# Patient Record
Sex: MALE | Race: WHITE | NOT HISPANIC OR LATINO | Employment: OTHER | URBAN - NONMETROPOLITAN AREA
[De-identification: names, ages, dates, MRNs, and addresses within clinical notes are randomized per-mention and may not be internally consistent; named-entity substitution may affect disease eponyms.]

---

## 2023-06-06 ENCOUNTER — OFFICE VISIT (OUTPATIENT)
Dept: PRIMARY CARE | Facility: CLINIC | Age: 83
End: 2023-06-06
Payer: MEDICARE

## 2023-06-06 VITALS
DIASTOLIC BLOOD PRESSURE: 78 MMHG | HEART RATE: 103 BPM | HEIGHT: 72 IN | SYSTOLIC BLOOD PRESSURE: 139 MMHG | BODY MASS INDEX: 21.67 KG/M2 | WEIGHT: 160 LBS

## 2023-06-06 DIAGNOSIS — F17.200 SMOKER: ICD-10-CM

## 2023-06-06 DIAGNOSIS — Z00.00 MEDICARE ANNUAL WELLNESS VISIT, SUBSEQUENT: ICD-10-CM

## 2023-06-06 DIAGNOSIS — I10 HYPERTENSION, BENIGN: ICD-10-CM

## 2023-06-06 DIAGNOSIS — I10 PRIMARY HYPERTENSION: ICD-10-CM

## 2023-06-06 DIAGNOSIS — K21.9 GASTROESOPHAGEAL REFLUX DISEASE WITHOUT ESOPHAGITIS: ICD-10-CM

## 2023-06-06 DIAGNOSIS — F41.9 ANXIETY: Primary | ICD-10-CM

## 2023-06-06 DIAGNOSIS — G89.29 CHRONIC BILATERAL LOW BACK PAIN WITHOUT SCIATICA: ICD-10-CM

## 2023-06-06 DIAGNOSIS — M54.50 CHRONIC BILATERAL LOW BACK PAIN WITHOUT SCIATICA: ICD-10-CM

## 2023-06-06 DIAGNOSIS — K44.9 HIATAL HERNIA: ICD-10-CM

## 2023-06-06 PROBLEM — M16.11 OSTEOARTHRITIS OF RIGHT HIP: Status: ACTIVE | Noted: 2023-06-06

## 2023-06-06 PROBLEM — M54.9 BACK PAIN: Status: ACTIVE | Noted: 2023-06-06

## 2023-06-06 PROCEDURE — 99213 OFFICE O/P EST LOW 20 MIN: CPT | Performed by: INTERNAL MEDICINE

## 2023-06-06 PROCEDURE — 1160F RVW MEDS BY RX/DR IN RCRD: CPT | Performed by: INTERNAL MEDICINE

## 2023-06-06 PROCEDURE — 1159F MED LIST DOCD IN RCRD: CPT | Performed by: INTERNAL MEDICINE

## 2023-06-06 PROCEDURE — 3075F SYST BP GE 130 - 139MM HG: CPT | Performed by: INTERNAL MEDICINE

## 2023-06-06 PROCEDURE — G0439 PPPS, SUBSEQ VISIT: HCPCS | Performed by: INTERNAL MEDICINE

## 2023-06-06 PROCEDURE — 3078F DIAST BP <80 MM HG: CPT | Performed by: INTERNAL MEDICINE

## 2023-06-06 RX ORDER — ALPRAZOLAM 0.5 MG/1
0.5 TABLET ORAL DAILY PRN
Qty: 30 TABLET | Refills: 2 | Status: SHIPPED | OUTPATIENT
Start: 2023-06-06 | End: 2023-09-06 | Stop reason: SDUPTHER

## 2023-06-06 RX ORDER — HYDROGEN PEROXIDE 3 %
1 SOLUTION, NON-ORAL MISCELLANEOUS DAILY
COMMUNITY
End: 2023-06-06 | Stop reason: SDUPTHER

## 2023-06-06 RX ORDER — ALPRAZOLAM 0.5 MG/1
TABLET ORAL
COMMUNITY
Start: 2019-11-11 | End: 2023-06-06 | Stop reason: SDUPTHER

## 2023-06-06 RX ORDER — HYDROGEN PEROXIDE 3 %
20 SOLUTION, NON-ORAL MISCELLANEOUS DAILY
Qty: 90 CAPSULE | Refills: 3 | Status: SHIPPED | OUTPATIENT
Start: 2023-06-06 | End: 2024-05-08 | Stop reason: SDUPTHER

## 2023-06-06 RX ORDER — AMLODIPINE AND BENAZEPRIL HYDROCHLORIDE 10; 20 MG/1; MG/1
1 CAPSULE ORAL DAILY
Qty: 90 CAPSULE | Refills: 3 | Status: SHIPPED | OUTPATIENT
Start: 2023-06-06

## 2023-06-06 RX ORDER — OXYBUTYNIN CHLORIDE 5 MG/1
TABLET, EXTENDED RELEASE ORAL
COMMUNITY
Start: 2022-12-07 | End: 2024-01-08 | Stop reason: WASHOUT

## 2023-06-06 RX ORDER — AMLODIPINE AND BENAZEPRIL HYDROCHLORIDE 10; 20 MG/1; MG/1
1 CAPSULE ORAL DAILY
COMMUNITY
Start: 2020-05-06 | End: 2023-06-06 | Stop reason: SDUPTHER

## 2023-06-06 RX ORDER — MELOXICAM 15 MG/1
1 TABLET ORAL DAILY PRN
COMMUNITY
Start: 2021-05-12

## 2023-06-06 ASSESSMENT — PATIENT HEALTH QUESTIONNAIRE - PHQ9
2. FEELING DOWN, DEPRESSED OR HOPELESS: NEARLY EVERY DAY
SUM OF ALL RESPONSES TO PHQ9 QUESTIONS 1 AND 2: 6
1. LITTLE INTEREST OR PLEASURE IN DOING THINGS: NEARLY EVERY DAY

## 2023-06-06 NOTE — PROGRESS NOTES
Chief Complaint: Medicare Wellness Exam/Comprehensive Problem Focused Follow Up and Physical Exam    HPI:  Patient is here today for [facility].     Active Problem List  Patient Active Problem List   Diagnosis    Anxiety    Hiatal hernia    Hypertension, benign    Back pain    Osteoarthritis of right hip       Comprehensive Medical/Surgical/Social/Family History  Past Medical History:   Diagnosis Date    Anxiety disorder, unspecified 12/06/2022    Anxiety    Essential (primary) hypertension 12/06/2022    HTN (hypertension)    Personal history of other diseases of the digestive system 06/08/2020    History of right inguinal hernia    Personal history of other specified conditions     History of dyspnea    Personal history of other specified conditions     History of dizziness    Personal history of pneumonia (recurrent)     History of pneumonia    Unilateral primary osteoarthritis, right hip 01/15/2020    Osteoarthritis of right hip     Past Surgical History:   Procedure Laterality Date    OTHER SURGICAL HISTORY  01/09/2020    Colonoscopy    OTHER SURGICAL HISTORY  01/09/2020    Vasectomy    OTHER SURGICAL HISTORY  02/17/2022    Inguinal hernia repair laparoscopic     Social History     Tobacco Use    Smoking status: Every Day     Types: Cigarettes    Smokeless tobacco: Never   Substance Use Topics    Alcohol use: Yes    Drug use: Never     No family history on file.      Allergies and Medications  Levofloxacin  Current Outpatient Medications on File Prior to Visit   Medication Sig Dispense Refill    meloxicam (Mobic) 15 mg tablet Take 1 tablet (15 mg) by mouth once daily as needed.      oxybutynin XL (Ditropan-XL) 5 mg 24 hr tablet       [DISCONTINUED] ALPRAZolam (Xanax) 0.5 mg tablet Take by mouth.      [DISCONTINUED] amLODIPine-benazepriL (Lotrel) 10-20 mg capsule Take 1 capsule by mouth once daily.      [DISCONTINUED] esomeprazole (NexIUM) 20 mg DR capsule Take 1 capsule (20 mg) by mouth once daily.       No current  facility-administered medications on file prior to visit.       Medicare Wellness Questionnaire        How have you been on Medicare less than a year ? No  Have you had a Medicare Wellness exam before ? Yes  Have you had any surgeries in the last year ? No  Have you developed any new diseases in the last year ? No  Have any close family members developed new diseases in the last year ? No  Have you been to a the hospital in the last year ? No  Do you take any pills or supplements other than those prescribed for you ? Yes  Do you take any opiates for pain such as Tramadol, Percocet or Norco ? No  How do you consider your overall health ?Fair  Have you ever used tobacco products ? Yes   Have you smoked more than 100 cigarettes in your life ?  Yes  What form of tobacco have you used ? Cigarettes  How many packs per day ? 1.5 ppd  How many years ? 55+ years   Have you quit ? No  Would you like to quit ? No  Do you drink alcohol ?  yes   What is the most you drink in one day ? 4  How many drinks usually in 1 Week ? 24  Do you or others tell you that your drinking is a problem ? No  Would you like to help to cut down or quit ? No  Have you ever used illegal drugs at anytime in your life including Marijuana ? Yes   Do you currently use any illegal drugs ? No  Which of the following describes your diet ?Unhealthy  How many days per week on average do you exercise ? 0 days  Do you have any loss of hearing ? No  Do you have hearing aids ? No  Have you or others noted you have loss of memory ? No  Do you need someone to assist you with any of the following ?none  Do you need someone to assist you with any of the following ?none  Have you fallen in the last 6 months ? No  Do you have any of the following in your house ? none  Do you have a living will ? No  Do you have a durable power of  for health care decisions ? No    Medications and Supplements  prescribed by me and other practitioners or clinical pharmacist (such  "as prescriptions, OTC's, herbal therapies and supplements) were reviewed and documented in the medical record.    Tobacco/Alcohol/Opioid use, as well as Illicit Drug Use was screened for/reviewed and documented in Social History section and medication list as appropriate  Activities of Daily Living  In your present state of health, do you have any difficulty performing the following activities?:   Preparing food and eating?: No  Bathing yourself: No  Getting dressed: No  Using the toilet:No  Moving around from place to place: No  In the past year have you fallen or had a near fall?:No    Depression Screen  (Note: if answer to either of the following is \"Yes\", then a more complete depression screening is indicated)   Q1: Over the past two weeks, have you felt down, depressed or hopeless?  No   Q2: Over the past two weeks, have you felt little interest or pleasure in doing things?  no    Current exercise habits: The patient does not participate in regular exercise at present.   Dietary issues discussed: Yes  Hearing difficulties: No  Safe in current home environment: yes  Visual Acuity assessed: no  Cognitive Impairment assessed: yes       Advance directives  Advanced Care Planning (including a Living Will, Healthcare POA, as well as specific end of life choices and/or directives), was discussed for approximately 1 minutes with the patient and/or surrogate, voluntarily, and documented in the medical record.     Cardiac Risk Assessment  Cardiovascular risk was discussed and, if needed, lifestyle modifications recommended, including nutritional choices, exercise, and elimination of habits contributing to risk. We agreed on a plan to reduce the current cardiovascular risk based on above discussion as needed.  Aspirin use/disuse was discussed after reviewing the updated guidelines below:    Consider low dose Aspirin ( mg) use if the benefit for cardiovascular disease prevention outweighs risk for bleeding " complications.   In general, low dose ASA should be considered:  In patients WITHOUT prior MI/stroke/PAD (primary prevention):   a. Age <60: Use if 10-year cardiovascular disease risk >20%, with discussion of risks and benefits with patient  b. Age 60-<70: Use if 10-year cardiovascular disease risk >20% and low bleeding (e.g., gastrointenstinal) risk, with discussion of risks and benefits with patient  c. Age >=70: Do not use    In patients WITH prior MI/stroke/PAD (secondary prevention):   Generally use unless extremely high bleeding (e.g., gastrointenstinal) risk, with discussion of risks and benefits with patient    ROS otherwise negative aside from what was mentioned above in HPI.    Vitals  /78 (BP Location: Right arm, Patient Position: Sitting, BP Cuff Size: Adult)   Pulse 103   Ht 1.829 m (6')   Wt 72.6 kg (160 lb)   BMI 21.70 kg/m²   Body mass index is 21.7 kg/m².  Physical Exam  Gen: Alert, NAD  HEENT:  PERRLA, EOMI, conjunctiva and sclera normal in appearance.   Neck:  Supple with FROM; No masses/nodes palpable; Thyroid nontender and without nodules; No PIERRE  Respiratory:  Lungs CTAB  Cardiovascular:  Heart RRR. No M/R/G. Peripheral pulses equal bilaterally  Abdomen:  Soft, nontender, BS present throughout; No R/G/R; No HSM or masses palpated  Extremities:  FROM all extremities; Muscle strength grossly normal with good tone  Neuro:  CN II-XII intact; Reflexes 2+/2+; Gross motor and sensory intact  Skin:  No suspicious lesions present      Assessment and Plan:  Problem List Items Addressed This Visit          Respiratory    Hiatal hernia       Circulatory    Hypertension, benign       Other    Anxiety - Primary    Relevant Medications    ALPRAZolam (Xanax) 0.5 mg tablet    Back pain     Other Visit Diagnoses       Gastroesophageal reflux disease without esophagitis        Relevant Medications    esomeprazole (NexIUM) 20 mg DR capsule    Primary hypertension        Relevant Medications     amLODIPine-benazepriL (Lotrel) 10-20 mg capsule    Other Relevant Orders    Comprehensive Metabolic Panel    Lipid Panel          1. Anxiety   - uses benzo prn  - I have personally reviewed this patient's OARRS report and found it to be appropriate. The report has been uploaded into the medical record. I have considered the risks of abuse, addiction, dependence, and diversion and feel that it is clinically appropriate for this patient to be prescribed this controlled medication.     2. HTN , controlled  - continue norvasc-benazapril 10-20 1 tab po daily   - will order cmp       3. GERD   - continue Nexium 20mg po daily     4. Back pain  - saw ortho and was told was coming from his back  - declines to do PT or see Pain Management   - sees chiropractor periodically      5 Urinary urge incontinence   - psa was normal in 2021  - discussed Referral to urology, declines for now , improved with bladder meds, takes a few times a week     6. Counseled on smoking cessation and cutting back on alcohol       During the course of the visit the patient was educated and counseled about age appropriate screening and preventive services. Completed preventive screenings were documented in the chart and orders were placed for outstanding screenings/procedures as documented in the Assessment and Plan.      Patient Instructions (the written plan) was given to the patient at check out.      Tata Billingsley, DO

## 2023-09-06 ENCOUNTER — OFFICE VISIT (OUTPATIENT)
Dept: PRIMARY CARE | Facility: CLINIC | Age: 83
End: 2023-09-06
Payer: MEDICARE

## 2023-09-06 VITALS
SYSTOLIC BLOOD PRESSURE: 122 MMHG | HEART RATE: 108 BPM | WEIGHT: 157 LBS | DIASTOLIC BLOOD PRESSURE: 80 MMHG | BODY MASS INDEX: 21.26 KG/M2 | HEIGHT: 72 IN

## 2023-09-06 DIAGNOSIS — M54.50 CHRONIC BILATERAL LOW BACK PAIN WITHOUT SCIATICA: ICD-10-CM

## 2023-09-06 DIAGNOSIS — I10 HYPERTENSION, BENIGN: Primary | ICD-10-CM

## 2023-09-06 DIAGNOSIS — G89.29 CHRONIC BILATERAL LOW BACK PAIN WITHOUT SCIATICA: ICD-10-CM

## 2023-09-06 DIAGNOSIS — F41.9 ANXIETY: ICD-10-CM

## 2023-09-06 PROCEDURE — 99213 OFFICE O/P EST LOW 20 MIN: CPT | Performed by: INTERNAL MEDICINE

## 2023-09-06 PROCEDURE — 3079F DIAST BP 80-89 MM HG: CPT | Performed by: INTERNAL MEDICINE

## 2023-09-06 PROCEDURE — 3074F SYST BP LT 130 MM HG: CPT | Performed by: INTERNAL MEDICINE

## 2023-09-06 PROCEDURE — 1160F RVW MEDS BY RX/DR IN RCRD: CPT | Performed by: INTERNAL MEDICINE

## 2023-09-06 PROCEDURE — 1159F MED LIST DOCD IN RCRD: CPT | Performed by: INTERNAL MEDICINE

## 2023-09-06 RX ORDER — ALPRAZOLAM 0.5 MG/1
0.5 TABLET ORAL DAILY PRN
Qty: 30 TABLET | Refills: 2 | Status: SHIPPED | OUTPATIENT
Start: 2023-09-06 | End: 2023-11-08 | Stop reason: SDUPTHER

## 2023-09-06 ASSESSMENT — ENCOUNTER SYMPTOMS: BACK PAIN: 1

## 2023-09-06 NOTE — PROGRESS NOTES
Subjective   Patient ID: Kashif Capps is a 83 y.o. male who presents for Follow-up (3 month/Follow up on labs).  HPI  Patient is here today for 3 mo follow up  Patient reports that he is having increasing back pain.  Pt saw Dr Nayak who told him that it was coming from his back.   Declines Pt, WILL REFER to pain management.     Review of Systems   Musculoskeletal:  Positive for back pain.       Objective   /80 (BP Location: Right arm, Patient Position: Sitting, BP Cuff Size: Adult)   Pulse 108   Ht 1.829 m (6')   Wt 71.2 kg (157 lb)   BMI 21.29 kg/m²     Physical Exam  Constitutional:       Appearance: Normal appearance.   Neurological:      Mental Status: He is alert.   Psychiatric:         Mood and Affect: Mood normal.         Behavior: Behavior normal.         Thought Content: Thought content normal.           Assessment/Plan   Problem List Items Addressed This Visit       Anxiety    Hypertension, benign - Primary    Back pain   1. Anxiety   - uses benzo prn  - I have personally reviewed this patient's OARRS report and found it to be appropriate. The report has been uploaded into the medical record. I have considered the risks of abuse, addiction, dependence, and diversion and feel that it is clinically appropriate for this patient to be prescribed this controlled medication.     2. HTN , controlled  - continue norvasc-benazapril 10-20 1 tab po daily       3. GERD   - continue Nexium 20mg po daily     4. Back pain  - saw ortho and was told was coming from his back  - will refer to pain management   - sees chiropractor periodically          Final diagnoses:   [F41.9] Anxiety   [I10] Hypertension, benign   [M54.50, G89.29] Chronic bilateral low back pain without sciatica

## 2023-09-08 ENCOUNTER — TELEPHONE (OUTPATIENT)
Dept: PRIMARY CARE | Facility: CLINIC | Age: 83
End: 2023-09-08
Payer: MEDICARE

## 2023-09-08 DIAGNOSIS — M54.50 CHRONIC BILATERAL LOW BACK PAIN WITHOUT SCIATICA: Primary | ICD-10-CM

## 2023-09-08 DIAGNOSIS — G89.29 CHRONIC BILATERAL LOW BACK PAIN WITHOUT SCIATICA: Primary | ICD-10-CM

## 2023-10-26 ENCOUNTER — APPOINTMENT (OUTPATIENT)
Dept: PAIN MEDICINE | Facility: CLINIC | Age: 83
End: 2023-10-26
Payer: MEDICARE

## 2023-11-08 ENCOUNTER — OFFICE VISIT (OUTPATIENT)
Dept: PRIMARY CARE | Facility: CLINIC | Age: 83
End: 2023-11-08
Payer: MEDICARE

## 2023-11-08 VITALS
BODY MASS INDEX: 21.4 KG/M2 | HEART RATE: 125 BPM | DIASTOLIC BLOOD PRESSURE: 80 MMHG | WEIGHT: 158 LBS | SYSTOLIC BLOOD PRESSURE: 129 MMHG | HEIGHT: 72 IN

## 2023-11-08 DIAGNOSIS — M54.50 CHRONIC BILATERAL LOW BACK PAIN WITHOUT SCIATICA: ICD-10-CM

## 2023-11-08 DIAGNOSIS — G89.29 CHRONIC BILATERAL LOW BACK PAIN WITHOUT SCIATICA: ICD-10-CM

## 2023-11-08 DIAGNOSIS — F41.9 ANXIETY: ICD-10-CM

## 2023-11-08 DIAGNOSIS — I10 HYPERTENSION, BENIGN: ICD-10-CM

## 2023-11-08 DIAGNOSIS — Z79.899 CONTROLLED SUBSTANCE AGREEMENT SIGNED: Primary | ICD-10-CM

## 2023-11-08 PROCEDURE — 80307 DRUG TEST PRSMV CHEM ANLYZR: CPT

## 2023-11-08 PROCEDURE — 99213 OFFICE O/P EST LOW 20 MIN: CPT | Performed by: INTERNAL MEDICINE

## 2023-11-08 PROCEDURE — 80358 DRUG SCREENING METHADONE: CPT

## 2023-11-08 PROCEDURE — 82570 ASSAY OF URINE CREATININE: CPT

## 2023-11-08 PROCEDURE — 3074F SYST BP LT 130 MM HG: CPT | Performed by: INTERNAL MEDICINE

## 2023-11-08 PROCEDURE — 3079F DIAST BP 80-89 MM HG: CPT | Performed by: INTERNAL MEDICINE

## 2023-11-08 PROCEDURE — 1159F MED LIST DOCD IN RCRD: CPT | Performed by: INTERNAL MEDICINE

## 2023-11-08 PROCEDURE — 80373 DRUG SCREENING TRAMADOL: CPT

## 2023-11-08 PROCEDURE — 80368 SEDATIVE HYPNOTICS: CPT

## 2023-11-08 PROCEDURE — 80346 BENZODIAZEPINES1-12: CPT

## 2023-11-08 PROCEDURE — 1160F RVW MEDS BY RX/DR IN RCRD: CPT | Performed by: INTERNAL MEDICINE

## 2023-11-08 PROCEDURE — 80365 DRUG SCREENING OXYCODONE: CPT

## 2023-11-08 PROCEDURE — 80361 OPIATES 1 OR MORE: CPT

## 2023-11-08 PROCEDURE — 80354 DRUG SCREENING FENTANYL: CPT

## 2023-11-08 RX ORDER — ALPRAZOLAM 0.5 MG/1
0.5 TABLET ORAL DAILY PRN
Qty: 90 TABLET | Refills: 0 | Status: SHIPPED | OUTPATIENT
Start: 2023-11-08 | End: 2024-02-08 | Stop reason: SDUPTHER

## 2023-11-08 ASSESSMENT — ENCOUNTER SYMPTOMS
SHORTNESS OF BREATH: 0
NAUSEA: 0
VOMITING: 0
APPETITE CHANGE: 0
COUGH: 0
ACTIVITY CHANGE: 0
FATIGUE: 0
CHILLS: 0
BACK PAIN: 1
WHEEZING: 0
NUMBNESS: 0
ABDOMINAL DISTENTION: 0
WEAKNESS: 0
DIARRHEA: 0
SINUS PAIN: 0
SINUS PRESSURE: 0
SORE THROAT: 0

## 2023-11-08 NOTE — PROGRESS NOTES
Subjective   Patient ID: Kashif Capps is a 83 y.o. male who presents for Follow-up (2 month) and Back Pain.  Back Pain  Pertinent negatives include no chest pain, numbness or weakness.     Patient is here today for 2 mo follow up   His significant other has moved back to [place] permanently to live with her daughters, she was getting to be too much for him with her dementia,     He had seen pain management and since Dr Schulte left he was going to  hold off but with Dr Prieto helping, he is going to call them to schedule.       Review of Systems   Constitutional:  Negative for activity change, appetite change, chills and fatigue.   HENT:  Negative for congestion, postnasal drip, sinus pressure, sinus pain and sore throat.    Respiratory:  Negative for cough, shortness of breath and wheezing.    Cardiovascular:  Negative for chest pain and leg swelling.   Gastrointestinal:  Negative for abdominal distention, diarrhea, nausea and vomiting.   Musculoskeletal:  Positive for back pain.   Neurological:  Negative for weakness and numbness.       Objective   /80   Pulse (!) 125   Ht 1.829 m (6')   Wt 71.7 kg (158 lb)   BMI 21.43 kg/m²     Physical Exam  Constitutional:       General: He is not in acute distress.     Appearance: Normal appearance.   HENT:      Head: Normocephalic.      Nose: Nose normal.      Mouth/Throat:      Pharynx: No oropharyngeal exudate.   Eyes:      General:         Right eye: No discharge.         Left eye: No discharge.      Extraocular Movements: Extraocular movements intact.      Pupils: Pupils are equal, round, and reactive to light.   Cardiovascular:      Rate and Rhythm: Normal rate and regular rhythm.      Heart sounds: No murmur heard.     No gallop.   Pulmonary:      Effort: Pulmonary effort is normal. No respiratory distress.      Breath sounds: Normal breath sounds. No wheezing.   Musculoskeletal:         General: No swelling. Normal range of motion.   Skin:     General: Skin is  warm and dry.      Coloration: Skin is not jaundiced.   Neurological:      General: No focal deficit present.      Mental Status: He is alert and oriented to person, place, and time.      Cranial Nerves: No cranial nerve deficit.   Psychiatric:         Mood and Affect: Mood normal.         Behavior: Behavior normal.       OARRS:  No data recorded  I have personally reviewed the OARRS report for Kashif Capps. I have considered the risks of abuse, dependence, addiction and diversion and I believe that it is clinically appropriate for Kashif Capps to be prescribed this medication    Is the patient prescribed a combination of a benzodiazepine and opioid?  No    Last Urine Drug Screen / ordered today: Yes  No results found for this or any previous visit (from the past 8760 hour(s)).  N/A        Controlled Substance Agreement:  Date of the Last Agreement: 11/08/2023  Reviewed Controlled Substance Agreement including but not limited to the benefits, risks, and alternatives to treatment with a Controlled Substance medication(s).    Benzodiazepines:  What is the patient's goal of therapy? Improvement of anxiety   Is this being achieved with current treatment? yes    JEANNETTE-7:  No data recorded    Activities of Daily Living:   Is your overall impression that this patient is benefiting (symptom reduction outweighs side effects) from benzodiazepine therapy? Yes     1. Physical Functioning: Better  2. Family Relationship: Better  3. Social Relationship: Better  4. Mood: Better  5. Sleep Patterns: Better  6. Overall Function: Better    Assessment/Plan   Problem List Items Addressed This Visit       Anxiety    Relevant Medications    ALPRAZolam (Xanax) 0.5 mg tablet    Hypertension, benign    Back pain     Other Visit Diagnoses       Controlled substance agreement signed    -  Primary    Relevant Orders    Opiate/Opioid/Benzo Prescription Compliance          1. Anxiety   - uses benzo prn  - I have personally reviewed this patient's  OARRS report and found it to be appropriate. The report has been uploaded into the medical record. I have considered the risks of abuse, addiction, dependence, and diversion and feel that it is clinically appropriate for this patient to be prescribed this controlled medication.     2. HTN , controlled  - continue norvasc-benazapril 10-20 1 tab po daily       3. GERD   - continue Nexium 20mg po daily     4. Back pain  - saw ortho and was told was coming from his back  - is going to call to schedule with pain managment  Final diagnoses:   [F41.9] Anxiety   [Z79.899] Controlled substance agreement signed   [I10] Hypertension, benign   [M54.50, G89.29] Chronic bilateral low back pain without sciatica

## 2023-11-09 LAB
AMPHETAMINES UR QL SCN: NORMAL
BARBITURATES UR QL SCN: NORMAL
BZE UR QL SCN: NORMAL
CANNABINOIDS UR QL SCN: NORMAL
CREAT UR-MCNC: 148.4 MG/DL (ref 20–370)
PCP UR QL SCN: NORMAL

## 2023-11-13 LAB
1OH-MIDAZOLAM UR CFM-MCNC: <25 NG/ML
6MAM UR CFM-MCNC: <25 NG/ML
7AMINOCLONAZEPAM UR CFM-MCNC: <25 NG/ML
A-OH ALPRAZ UR CFM-MCNC: 79 NG/ML
ALPRAZ UR CFM-MCNC: 44 NG/ML
CHLORDIAZEP UR CFM-MCNC: <25 NG/ML
CLONAZEPAM UR CFM-MCNC: <25 NG/ML
CODEINE UR CFM-MCNC: <50 NG/ML
DIAZEPAM UR CFM-MCNC: <25 NG/ML
EDDP UR CFM-MCNC: <25 NG/ML
FENTANYL UR CFM-MCNC: <2.5 NG/ML
HYDROCODONE CTO UR CFM-MCNC: <25 NG/ML
HYDROMORPHONE UR CFM-MCNC: <25 NG/ML
LORAZEPAM UR CFM-MCNC: <25 NG/ML
METHADONE UR CFM-MCNC: <25 NG/ML
MIDAZOLAM UR CFM-MCNC: <25 NG/ML
MORPHINE UR CFM-MCNC: <50 NG/ML
NORDIAZEPAM UR CFM-MCNC: <25 NG/ML
NORFENTANYL UR CFM-MCNC: <2.5 NG/ML
NORHYDROCODONE UR CFM-MCNC: <25 NG/ML
NOROXYCODONE UR CFM-MCNC: <25 NG/ML
NORTRAMADOL UR-MCNC: <50 NG/ML
OXAZEPAM UR CFM-MCNC: <25 NG/ML
OXYCODONE UR CFM-MCNC: <25 NG/ML
OXYMORPHONE UR CFM-MCNC: <25 NG/ML
TEMAZEPAM UR CFM-MCNC: <25 NG/ML
TRAMADOL UR CFM-MCNC: <50 NG/ML
ZOLPIDEM UR CFM-MCNC: <25 NG/ML
ZOLPIDEM UR-MCNC: <25 NG/ML

## 2024-01-04 ENCOUNTER — PREP FOR PROCEDURE (OUTPATIENT)
Dept: OPERATING ROOM | Facility: HOSPITAL | Age: 84
End: 2024-01-04
Payer: MEDICARE

## 2024-01-04 DIAGNOSIS — H25.812 COMBINED FORMS OF AGE-RELATED CATARACT OF LEFT EYE: Primary | ICD-10-CM

## 2024-01-04 RX ORDER — KETOROLAC TROMETHAMINE 5 MG/ML
1 SOLUTION OPHTHALMIC
Status: CANCELLED | OUTPATIENT
Start: 2024-01-11 | End: 2024-01-11

## 2024-01-04 RX ORDER — PREDNISOLONE ACETATE 10 MG/ML
1 SUSPENSION/ DROPS OPHTHALMIC ONCE
Status: CANCELLED | OUTPATIENT
Start: 2024-01-11 | End: 2024-01-04

## 2024-01-04 RX ORDER — TETRACAINE HYDROCHLORIDE 5 MG/ML
1 SOLUTION OPHTHALMIC ONCE
Status: CANCELLED | OUTPATIENT
Start: 2024-01-11 | End: 2024-01-04

## 2024-01-04 RX ORDER — POVIDONE-IODINE 5 %
SOLUTION, NON-ORAL OPHTHALMIC (EYE) ONCE
Status: CANCELLED | OUTPATIENT
Start: 2024-01-11 | End: 2024-01-04

## 2024-01-08 NOTE — PREPROCEDURE INSTRUCTIONS
No outpatient medications have been marked as taking for the 1/11/24 encounter (Hospital Encounter).                       NPO Instructions:    Nothing to eat or drink after midnight    Additional Instructions:   Will need  home, will receive call day before surgery with arrival time

## 2024-01-10 ENCOUNTER — ANESTHESIA EVENT (OUTPATIENT)
Dept: OPERATING ROOM | Facility: HOSPITAL | Age: 84
End: 2024-01-10
Payer: MEDICARE

## 2024-01-11 ENCOUNTER — ANESTHESIA (OUTPATIENT)
Dept: OPERATING ROOM | Facility: HOSPITAL | Age: 84
End: 2024-01-11
Payer: MEDICARE

## 2024-01-11 ENCOUNTER — HOSPITAL ENCOUNTER (OUTPATIENT)
Facility: HOSPITAL | Age: 84
Setting detail: OUTPATIENT SURGERY
Discharge: HOME | End: 2024-01-11
Attending: OPHTHALMOLOGY | Admitting: OPHTHALMOLOGY
Payer: MEDICARE

## 2024-01-11 VITALS
BODY MASS INDEX: 21.53 KG/M2 | OXYGEN SATURATION: 98 % | DIASTOLIC BLOOD PRESSURE: 73 MMHG | WEIGHT: 158.95 LBS | RESPIRATION RATE: 18 BRPM | HEART RATE: 74 BPM | TEMPERATURE: 97.8 F | SYSTOLIC BLOOD PRESSURE: 124 MMHG | HEIGHT: 72 IN

## 2024-01-11 PROBLEM — H25.812 COMBINED FORMS OF AGE-RELATED CATARACT OF LEFT EYE: Status: RESOLVED | Noted: 2024-01-04 | Resolved: 2024-01-11

## 2024-01-11 PROCEDURE — C1780 LENS, INTRAOCULAR (NEW TECH): HCPCS | Performed by: OPHTHALMOLOGY

## 2024-01-11 PROCEDURE — 2500000001 HC RX 250 WO HCPCS SELF ADMINISTERED DRUGS (ALT 637 FOR MEDICARE OP): Performed by: OPHTHALMOLOGY

## 2024-01-11 PROCEDURE — 3700000002 HC GENERAL ANESTHESIA TIME - EACH INCREMENTAL 1 MINUTE: Performed by: OPHTHALMOLOGY

## 2024-01-11 PROCEDURE — 2500000004 HC RX 250 GENERAL PHARMACY W/ HCPCS (ALT 636 FOR OP/ED): Performed by: ANESTHESIOLOGY

## 2024-01-11 PROCEDURE — 3600000008 HC OR TIME - EACH INCREMENTAL 1 MINUTE - PROCEDURE LEVEL THREE: Performed by: OPHTHALMOLOGY

## 2024-01-11 PROCEDURE — 3700000001 HC GENERAL ANESTHESIA TIME - INITIAL BASE CHARGE: Performed by: OPHTHALMOLOGY

## 2024-01-11 PROCEDURE — 2760000001 HC OR 276 NO HCPCS: Performed by: OPHTHALMOLOGY

## 2024-01-11 PROCEDURE — 7100000009 HC PHASE TWO TIME - INITIAL BASE CHARGE: Performed by: OPHTHALMOLOGY

## 2024-01-11 PROCEDURE — 2720000007 HC OR 272 NO HCPCS: Performed by: OPHTHALMOLOGY

## 2024-01-11 PROCEDURE — 2500000004 HC RX 250 GENERAL PHARMACY W/ HCPCS (ALT 636 FOR OP/ED): Mod: JZ | Performed by: OPHTHALMOLOGY

## 2024-01-11 PROCEDURE — 3600000003 HC OR TIME - INITIAL BASE CHARGE - PROCEDURE LEVEL THREE: Performed by: OPHTHALMOLOGY

## 2024-01-11 PROCEDURE — 7100000010 HC PHASE TWO TIME - EACH INCREMENTAL 1 MINUTE: Performed by: OPHTHALMOLOGY

## 2024-01-11 DEVICE — IMPLANTABLE DEVICE: Type: IMPLANTABLE DEVICE | Site: EYE | Status: FUNCTIONAL

## 2024-01-11 RX ORDER — KETOROLAC TROMETHAMINE 5 MG/ML
1 SOLUTION OPHTHALMIC
Status: COMPLETED | OUTPATIENT
Start: 2024-01-11 | End: 2024-01-11

## 2024-01-11 RX ORDER — TETRACAINE HYDROCHLORIDE 5 MG/ML
1 SOLUTION OPHTHALMIC ONCE
Status: COMPLETED | OUTPATIENT
Start: 2024-01-11 | End: 2024-01-11

## 2024-01-11 RX ORDER — MIDAZOLAM HYDROCHLORIDE 1 MG/ML
1 INJECTION INTRAMUSCULAR; INTRAVENOUS ONCE
Status: COMPLETED | OUTPATIENT
Start: 2024-01-11 | End: 2024-01-11

## 2024-01-11 RX ORDER — MIDAZOLAM HYDROCHLORIDE 1 MG/ML
INJECTION INTRAMUSCULAR; INTRAVENOUS AS NEEDED
Status: DISCONTINUED | OUTPATIENT
Start: 2024-01-11 | End: 2024-01-11

## 2024-01-11 RX ORDER — FENTANYL CITRATE 50 UG/ML
INJECTION, SOLUTION INTRAMUSCULAR; INTRAVENOUS AS NEEDED
Status: DISCONTINUED | OUTPATIENT
Start: 2024-01-11 | End: 2024-01-11

## 2024-01-11 RX ORDER — PREDNISOLONE ACETATE 10 MG/ML
1 SUSPENSION/ DROPS OPHTHALMIC ONCE
Status: COMPLETED | OUTPATIENT
Start: 2024-01-11 | End: 2024-01-11

## 2024-01-11 RX ORDER — ACETAZOLAMIDE 250 MG/1
500 TABLET ORAL ONCE
Status: COMPLETED | OUTPATIENT
Start: 2024-01-11 | End: 2024-01-11

## 2024-01-11 RX ORDER — SODIUM CHLORIDE, SODIUM LACTATE, POTASSIUM CHLORIDE, CALCIUM CHLORIDE 600; 310; 30; 20 MG/100ML; MG/100ML; MG/100ML; MG/100ML
50 INJECTION, SOLUTION INTRAVENOUS CONTINUOUS
Status: DISCONTINUED | OUTPATIENT
Start: 2024-01-11 | End: 2024-01-11 | Stop reason: HOSPADM

## 2024-01-11 RX ORDER — POVIDONE-IODINE 5 %
SOLUTION, NON-ORAL OPHTHALMIC (EYE) ONCE
Status: COMPLETED | OUTPATIENT
Start: 2024-01-11 | End: 2024-01-11

## 2024-01-11 RX ORDER — ONDANSETRON HYDROCHLORIDE 2 MG/ML
4 INJECTION, SOLUTION INTRAVENOUS ONCE
Status: COMPLETED | OUTPATIENT
Start: 2024-01-11 | End: 2024-01-11

## 2024-01-11 RX ADMIN — FENTANYL CITRATE 25 MCG: 50 INJECTION, SOLUTION INTRAMUSCULAR; INTRAVENOUS at 10:29

## 2024-01-11 RX ADMIN — MIDAZOLAM HYDROCHLORIDE 1 MG: 1 INJECTION, SOLUTION INTRAMUSCULAR; INTRAVENOUS at 10:11

## 2024-01-11 RX ADMIN — PHENYLEPHRINE HYDROCHLORIDE 1 DROP: 100 SOLUTION/ DROPS OPHTHALMIC at 09:20

## 2024-01-11 RX ADMIN — MIDAZOLAM HYDROCHLORIDE 0.5 MG: 1 INJECTION, SOLUTION INTRAMUSCULAR; INTRAVENOUS at 10:25

## 2024-01-11 RX ADMIN — TETRACAINE HYDROCHLORIDE 1 DROP: 5 SOLUTION OPHTHALMIC at 09:07

## 2024-01-11 RX ADMIN — FENTANYL CITRATE 50 MCG: 50 INJECTION, SOLUTION INTRAMUSCULAR; INTRAVENOUS at 10:22

## 2024-01-11 RX ADMIN — PHENYLEPHRINE HYDROCHLORIDE 1 DROP: 100 SOLUTION/ DROPS OPHTHALMIC at 09:08

## 2024-01-11 RX ADMIN — POLYVINYL ALCOHOL, POVIDONE 1 DROP: 14; 6 SOLUTION/ DROPS OPHTHALMIC at 09:07

## 2024-01-11 RX ADMIN — PHENYLEPHRINE HYDROCHLORIDE 1 DROP: 100 SOLUTION/ DROPS OPHTHALMIC at 09:13

## 2024-01-11 RX ADMIN — MIDAZOLAM HYDROCHLORIDE 0.5 MG: 1 INJECTION, SOLUTION INTRAMUSCULAR; INTRAVENOUS at 10:21

## 2024-01-11 RX ADMIN — SODIUM CHLORIDE, POTASSIUM CHLORIDE, SODIUM LACTATE AND CALCIUM CHLORIDE 50 ML/HR: 600; 310; 30; 20 INJECTION, SOLUTION INTRAVENOUS at 09:14

## 2024-01-11 RX ADMIN — KETOROLAC TROMETHAMINE 1 DROP: 5 SOLUTION OPHTHALMIC at 09:30

## 2024-01-11 RX ADMIN — ACETAZOLAMIDE 500 MG: 250 TABLET ORAL at 11:02

## 2024-01-11 RX ADMIN — POLYVINYL ALCOHOL, POVIDONE 1 DROP: 14; 6 SOLUTION/ DROPS OPHTHALMIC at 09:14

## 2024-01-11 RX ADMIN — MIDAZOLAM HYDROCHLORIDE 0.5 MG: 1 INJECTION, SOLUTION INTRAMUSCULAR; INTRAVENOUS at 10:35

## 2024-01-11 RX ADMIN — KETOROLAC TROMETHAMINE 1 DROP: 5 SOLUTION OPHTHALMIC at 09:08

## 2024-01-11 RX ADMIN — KETOROLAC TROMETHAMINE 1 DROP: 5 SOLUTION OPHTHALMIC at 09:20

## 2024-01-11 RX ADMIN — POVIDONE-IODINE: 5 SOLUTION OPHTHALMIC at 09:07

## 2024-01-11 RX ADMIN — KETOROLAC TROMETHAMINE 1 DROP: 5 SOLUTION OPHTHALMIC at 09:14

## 2024-01-11 RX ADMIN — ONDANSETRON 4 MG: 2 INJECTION INTRAMUSCULAR; INTRAVENOUS at 09:14

## 2024-01-11 RX ADMIN — PHENYLEPHRINE HYDROCHLORIDE 1 DROP: 100 SOLUTION/ DROPS OPHTHALMIC at 09:30

## 2024-01-11 RX ADMIN — POLYVINYL ALCOHOL, POVIDONE 1 DROP: 14; 6 SOLUTION/ DROPS OPHTHALMIC at 09:20

## 2024-01-11 RX ADMIN — POLYVINYL ALCOHOL, POVIDONE 1 DROP: 14; 6 SOLUTION/ DROPS OPHTHALMIC at 09:30

## 2024-01-11 SDOH — HEALTH STABILITY: MENTAL HEALTH: CURRENT SMOKER: 0

## 2024-01-11 ASSESSMENT — PAIN - FUNCTIONAL ASSESSMENT
PAIN_FUNCTIONAL_ASSESSMENT: 0-10
PAIN_FUNCTIONAL_ASSESSMENT: 0-10

## 2024-01-11 ASSESSMENT — COLUMBIA-SUICIDE SEVERITY RATING SCALE - C-SSRS
2. HAVE YOU ACTUALLY HAD ANY THOUGHTS OF KILLING YOURSELF?: NO
1. IN THE PAST MONTH, HAVE YOU WISHED YOU WERE DEAD OR WISHED YOU COULD GO TO SLEEP AND NOT WAKE UP?: NO
6. HAVE YOU EVER DONE ANYTHING, STARTED TO DO ANYTHING, OR PREPARED TO DO ANYTHING TO END YOUR LIFE?: NO

## 2024-01-11 ASSESSMENT — PAIN SCALES - GENERAL
PAINLEVEL_OUTOF10: 0 - NO PAIN
PAINLEVEL_OUTOF10: 1
PAIN_LEVEL: 0

## 2024-01-11 NOTE — OP NOTE
Phacoemulsification Cataract with Insertion Intraocular Lens (L) Operative Note     Date: 2024  OR Location: [facility] OR    Name: Kashif Capps, : 1940, Age: 83 y.o., MRN: [id number], Sex: male    Diagnosis  Pre-op Diagnosis     * Combined forms of age-related cataract of left eye [H25.812] Post-op Diagnosis     * Combined forms of age-related cataract of left eye [H25.812]     Procedures  Phacoemulsification Cataract with Insertion Intraocular Lens  45734 - [place] XCAPSL CTRC RMVL INSJ IO LENS PROSTH W/O ECP      Surgeons      * Dmitri Kimball - Primary    Resident/Fellow/Other Assistant:  Surgeon(s) and Role:    Procedure Summary  Anesthesia: Monitor Anesthesia Care  ASA: II  Anesthesia Staff: Anesthesiologist: Celio Tran DO  Estimated Blood Loss: None  Intra-op Medications:   Medication Name Total Dose   lidocaine 1%-phenylephrine 1.5% intravitreal injection 2 mL   moxifloxacin (Vigamox) 1.5 mg/1 mL (0.15%) injection 1.5 mg 1.5 mg   prednisoLONE acetate (Pred-Forte) 1 % ophthalmic suspension 1 drop 1 drop   lactated Ringer's infusion Cannot be calculated     Anesthesia Record        Intraprocedure I/O Totals       Intake    lactated Ringer's infusion 350.00 mL    Total Intake 350 mL     Specimen: No specimens collected     Staff:   Circulator: Arabella Watkins RN  Scrub Person: Akira Zavala RN    Drains and/or Catheters: * None in log *    Tourniquet Times:       Implants:  Implants       Type Name Action Serial No.      Lens LENS, INTRAOCULAR, [id number] 17.5 - [id number] - [id number] Implanted [id number]        Findings: Combined Form Age Related Cataract Left Eye    Indications: Kashif Capps is an 83 y.o. male who is having surgery for Combined forms of age-related cataract of left eye [H25.812]. Decreased vision left eye for near and distance.  Difficulty seeing for reading and watching TV left eye.  Difficulty seeing to drive left eye, complains of glare and halos.     The patient was seen in the  preoperative area. The risks, benefits, complications, treatment options, non-operative alternatives, expected recovery and outcomes were discussed with the patient. The possibilities of reaction to medication, pulmonary aspiration, injury to surrounding structures, bleeding, recurrent infection, the need for additional procedures, failure to diagnose a condition, and creating a complication requiring transfusion or operation were discussed with the patient. The patient concurred with the proposed plan, giving informed consent.  The site of surgery was properly noted/marked if necessary per policy. The patient has been actively warmed in preoperative area. Preoperative antibiotics are not indicated. Venous thrombosis prophylaxis are not indicated.    Procedure Details: The patient was correctly identified in the preop area and the operative eye was marked with a marking pen. The operative eye was dilated in the preoperative area.  The patient was then taken to the operating room where timeout was performed before starting the procedure. Combined anesthesia  with intravenous sedation and topical tetracaine eyedrops were given the left eye. The operative eye was prepped and draped in the standard sterile ophthalmic fashion in  preparation for ophthalmic surgery.  A Jose wire speculum was then inserted between the eyelids of the left eye and the operating microscope was placed over the left eye.  A paracentesis incision was made approximately 30° away from the planned surgical incision site with the help of MVR blade.  1% lidocaine MPF with Phenylephrine 1.5% PF was injected into the anterior chamber through the paracentesis incision. A near limbal clear corneal incision was fashioned in the temporal quadrant just outside the vascular arcade and Viscoat was injected into anterior chamber to firm the eye. A bent needle cystotome was used and Utrata forceps were utilized to create a continuous curvilinear  capsulorrhexis.  BSS was injected beneath the anterior capsule to hydrodissect the nucleus from adjacent cortex and capsule.  The residual cortex were then aspirated with irrigation aspiration handpiece. The posterior capsule was then polished with the help of soft irrigation-aspiration tip.  Provisc viscoelastic was then injected into the eye to reform the anterior chamber and to open the capsular bag.  The intraocular lens implant was taken from its sterile wrapping, inspected under the surgical microscope and found to be in good condition. The intraocular lens implant +17.5D was injected into the capsule bag. The Provisc was then aspirated from the anterior chamber and from behind the intraocular lens implant.  The anterior chamber was inflated with the help of BSS to moderate tension.  The edges of the surgical incision were then hydrated with the help of BSS.  Vigamox was then injected into the anterior chamber and into the capsule bag through the paracentesis incision. The surgical wound was then inspected and found to be watertight.  The wire speculum and drapes were then removed.  Pred Forte eyedrops, Acular eyedrops and Betadine 5% sterile ophthalmic solution were instilled in the conjunctival sac.     The patient tolerated the procedure well and was taken to recovery room in stable condition.    Complications:  None; patient tolerated the procedure well.    Disposition:  Home  Condition: stable     Attending Attestation: I performed the procedure.    Dmitri Kimball  Phone Number: [phone]

## 2024-01-11 NOTE — H&P
H&P Notes  - documented in this encounter  Dmitri Kimball MD - 01/08/2024 1:39 PM EST  Formatting of this note is different from the original.  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  HPI: Associated symptoms  Blurry vision, difficulty reading small print, glare and halos around lights at night    PAST MEDICAL HISTORY  Diagnosis Date  Anxiety  Erectile dysfunction  GERD (gastroesophageal reflux disease)  Hypertension    PAST SURGICAL HISTORY  Procedure Laterality Date  NONE    FAMILY HISTORY  Problem Relation Age of Onset  Macular Degen Mother    SOCIAL HISTORY:  Social History    Tobacco Use  Smoking status: Every Day  Packs/day: 2.00  Years: 65.00  Additional pack years: 0.00  Total pack years: 130.00  Types: Cigarettes  Smokeless tobacco: Never    MEDICATIONS:  Prior to Admission medications as of 1/8/24 1337  Medication Sig Last Dose Taking  fluorometholone (FML LIQUID FILM) 0.1 % ophthalmic suspension Use 1 Drop in both eyes three times a day. Taking Yes  amLODIPine-benazepril (LOTREL) 10-20 mg per capsule Take 1 capsule by mouth once daily. Taking Yes  ALPRAZolam (XANAX) 0.5 mg tablet Take 1 tablet by mouth at bedtime as needed. Taking Yes  esomeprazole (NEXIUM) 40 mg capsule Take 1 capsule by mouth once daily. Taking Yes  sildenafil (VIAGRA) 100 mg tablet Take 1 tablet by mouth as needed. Taking Yes    No medication comments found.    CURRENT ALLERGIES: ALLERGIES  No Known Allergies    REVIEW OF SYSTEMS:  PAIN ASSESSMENT:  General: No weight loss, malaise or fevers.  Neuro: No Hx  of stroke or seizures  Respiratory: No history of current cough or dyspnea, or pneumonia in the past 6 weeks. No history of respiratory/pulmonary symptoms or problems  Cardiovascular: Positive for: Hypertension  GI: No history of GI symptoms or problems. No history of esophageal varices, recent ascites, or ETOH greater than 2 drinks per day.  : No history of UTI in past 6 weeks. No history of renal failure. Not currently on or requiring dialysis. No history of  symptoms or problems.  GYN: N/A  Pregnancy: N/A  Endocrine: No history of diabetes. Has not taken steroids within the past 30 days. No history of endocrinological symptoms or problems.  Hematology: No history of bleeding or clotting disorder. Pt is not taking anti-coagulation or platelet medications. No history of hematological symptoms or problems.  Oncology: No history of CA metastasis, chemo within 30 days, or radiotherapy within 90 days. Has not lost 10% of body wt in 6 months. No history of oncological symptoms or problems.  Psych: Anxiety  Musculoskeletal: Negative for joint pain or swelling, back pain or muscle pain.  Skin: Negative for lesions, rash and itching.    PHYSICAL EXAM:  VITALS:  /72  Pulse 56        General: Alert and oriented  Skin: Normal color, no rash, no lesions.  HEENT: EOM, pupils equal, round and reactive.  Cardiovascular: Normal S1 & S2, no rubs, murmurs or gallops. No JVD. Pulse regular.  Lungs: Normal breath sounds, no wheezes or crackles.  Abdomen: Soft, non-tender, no rigidity.  Extremities: No deformity, no edema or tenderness, no joint swelling or clubbing.  Neurological: Normal cognition and motor skills.  Pulses: Carotid and radial pulses normal +2.    Diagnostic tests reviewed for today's visit:  No new labs    ASSESSMENT  Medication and Non-Pharmacologic VTE Prophylaxis/Anticoagulants      VTE Prophylaxis: VTE prophylaxis appropriate    Impression: There is no known pertinent medical condition which may affect  joel-operative course    Clinical Risk Factors for Possible Cardiac Complications:  None    Patient is scheduled for a low-risk procedure.    FUNCTIONAL STATUS: Walk indoors, such as around the house (1.75 METs)  Do light work around the house, such as dusting or washing dishes (2.70 METs)  Take care of self, that is eating, dressing, bathing, using the toilet (2.75 METs)    Functional Class (NYHA): N/A    [facility]: Not obtained    PLAN  CONSULTS:  Patient does not require consults for optimization at this time.    The Following Tests/Procedures Have Been Initiated:  None    Instructions Given to Patient:  Patient given verbal and written preop instructions and voices comprehension and compliance.    SIGNATURE: Dmitri Kimball MD PATIENT NAME: Kashif Capps  DATE: January 8, 2024 MRN: [id number]  TIME: 1:39 PM PAGER/CONTACT #:    Electronically signed by Dmitri Kimball MD at 01/08/2024 1:53 PM EST   Source Comments  - [facility]  In the event this information is protected by the Federal Confidentiality of Alcohol and Drug Abuse Patient Records regulations: This information has been disclosed to you from records protected by Federal confidentiality rules (42 CFR Part 2). The Federal rules prohibit you from making any further disclosure of this information unless further disclosure is expressly permitted by the written consent of the person to whom it pertains or as otherwise permitted by 42 CFR Part 2. A general authorization for the release of medical or other information is NOT sufficient for this purpose. The Federal rules restrict any use of the information to criminally investigate or prosecute any alcohol or drug abuse patient.  Reason for Visit    Reason for Visit -  Reason Comments   Blurred Vision Both Eyes     Difficulty Reading Both Eyes     Glare Both eyes     Encounter Details    Encounter Details  Date Type Department Care Team (Latest Contact Info) Description   01/08/2024 1:00 PM EST Office  Visit [facility]   [address] [place] [address]   [phone]  Dmitri Kimball MD   [address] [place] [address]   [phone] (Work)   [phone] (Fax)  Combined form of age-related cataract, left eye (Primary Dx);  Combined form of age-related cataract, right eye;  Early dry stage nonexudative age-related macular degeneration of both eyes;  Visual field loss, left eye ;  Essential hypertension;  Anxiety disorder, unspecified type     Social History  - documented as of this encounter  Social History  Tobacco Use Types Packs/Day Years Used Date   Smoking Tobacco: Every Day Cigarettes 2 65     Smokeless Tobacco: Never             Social History  Alcohol Use Standard Drinks/Week Comments   Not Asked 0 (1 standard drink = 0.6 oz pure alcohol) occasional     Social History  Sex and Gender Information Value Date Recorded   Sex Assigned at Birth Not on file     Gender Identity Not on file     Sexual Orientation Not on file       Last Filed Vital Signs  - documented in this encounter  Last Filed Vital Signs  Vital Sign Reading Time Taken Comments   Blood Pressure 153/72 01/08/2024 1:14 PM EST     Pulse 56 01/08/2024 1:14 PM EST     Temperature - -     Respiratory Rate - -     Oxygen Saturation - -     Inhaled Oxygen Concentration - -     Weight - -     Height - -     Body Mass Index - -       Functional Status  - documented as of this encounter  Functional Status  Functional Status Response Date of Assessment   Are you deaf or do you have serious difficulty hearing? No 12/12/2014   Are you blind or do you have serious difficulty seeing, even when wearing glasses? No 12/12/2014   Do you have serious difficulty walking or climbing stairs? No 12/12/2014   Do you have difficulty dressing or bathing? No 12/12/2014   Because of a physical, mental, or emotional condition, do you have difficulty doing errands alone such as visiting a doctor's office or shopping? No 12/12/2014     Functional  Status  Cognitive Status Response Date of Assessment   Because of a physical, mental, or emotional condition, do you have serious difficulty concentrating, remembering, or making decisions? No 12/12/2014     Patient Instructions  - documented in this encounter  Patient Instructions  Dmitri Kimball MD - 01/08/2024 1:39 PM EST   Formatting of this note is different from the original.  Current Ophthalmic Meds        fluorometholone (FML LIQUID FILM) 0.1 % ophthalmic suspension Use 1 Drop in both eyes three times a day.    Systane Complete Artificial Tears - Use 1 Drop into both eyes three times a day.  AREDS 2 Vitamins - Take 2 capsules by mouth daily    Please monitor each eye daily with Amsler Grid.    If you have any questions please contact our office at [phone].  After office hours or on the weekend, please call Dr. Kimball on his cell phone at [phone].  Electronically signed by Dmitri Kimball MD at 01/08/2024 1:39 PM EST     Progress Notes  - documented in this encounter  Dmitri Kimball MD - 01/08/2024 1:37 PM EST  Formatting of this note is different from the original.  ASSESSMENT/PLAN:  1. Combined form of age-related cataract, left eye - ICD9: 366.19, ICD10: H25.812 (primary diagnosis)    Upon eye examination, patient was found to have a visually significant cataract both eyes. Discussed cataract surgery with patient and different intraocular lens implant options with patient: basic monofocal intraocular lens implant, Toric intraocular lens implant, and presbyopia correction intraocular lens implant. In my medical opinion, based on medical history and ocular examination, cataract surgery with intraocular lens implant will correct patient's vision and improve quality of patient's daily living activities. Patient wishes to have traditional cataract surgery with basic intraocular lens left eye scheduled on 1/11/24. Patient wishes to have cataract surgery with the option stated above. Patient  understands that an intraocular lens implant does not necessarily replace the need for glasses. Patient understands that it is impossible for the surgeon to inform him/her of every possible complication that may occur. The surgeon has answered all of the patient's questions. Patient understands that if he/she has a mature or dense cataract, pseudoexfoliation cataract, or history of use of Flomax, he/she may require the use of Maluyugin Ring and/or Vision Blue during surgery. Patient understands the risks, benefits, and alternatives to surgery.    Continue  Current Ophthalmic Meds        fluorometholone (FML LIQUID FILM) 0.1 % ophthalmic suspension Use 1 Drop in both eyes three times a day.    Systane Complete Artificial Tears - Use 1 Drop into both eyes three times a day.    Cataract Presurgical Documentation    Cataract: Left eye (OS)    Current Visual Acuity  Right Eye Distance CC 20/50  Left Eye Distance CC 20/80    Visual Function: Kashif GOULD Génesis states that the decline in vision from the cataract impedes his abilities as listed in the HPI, as well as other activities of daily living.    Kashif Capps has confirmed that he is no longer able to function adequately on a day-to-day basis because of his current visual condition.    Further, it is my medical opinion that the cataract is the primary cause, or at least a significantly contributory cause of his visual dysfunction. With uncomplicated cataract surgery and lens implantation, it is my expectation that his visual function and quality of life will improve, significantly.    The risks, benefits, alternatives, personnel and complications of cataract surgery with lens implantation were discussed with Kashif Capps in detail. he appeared to understand and asked that I proceed with plans for surgery.    PHYSICAL EXAM:    Vital Signs:  Blood pressure 153/72, pulse (!) 56.    Respiratory:  Normal breath sounds, no wheezing.    CARD:  Normal heart sounds 1 & 2, normal  sinus rhythm.    2. Combined form of age-related cataract, right eye - ICD9: 366.19, ICD10: H25.811  - Plan cataract surgery right eye after left eye is completed and stable    3. Early dry stage nonexudative age-related macular degeneration of both eyes - ICD9: 362.51, ICD10: H35.3131  - Please monitor each eye daily with Amsler Grid. AREDS 2 Vitamins are available over the counter at any drug store.  - Monitor    4. Visual field loss, left eye - ICD9: 368.40, ICD10: H53.40  - Repeat visual field after cataract removal left eye    5. Essential hypertension - ICD9: 401.9, ICD10: I10  6. Anxiety disorder, unspecified type - ICD9: 300.00, ICD10: F41.9  - Continue to monitor with PCP    I have confirmed and edited as necessary the relevant ophthalmic history, review of systems, surgical history, and ophthalmological examination findings as obtained by the ophthalmic technical staff. I have seen and examined Kashif Capps. I have discussed the examination findings, diagnosis, and treatment options with Kashif Capps and/or his family. I have also reviewed and agree with the assessment and plan as stated above and agree with all its relevant components. I gave the patient the opportunity to ask questions about the findings, diagnosis, and treatment options.    Dmitri Kimball MD    Electronically signed by Dmitri Kimball MD at 01/08/2024 1:53 PM EST   Plan of Treatment  - documented as of this encounter  Plan of Treatment - Scheduled Procedures  Scheduled Procedures  Name Priority Associated Diagnoses Date/Time   SURGERY AT NON-[facility] [place], operated by [facility]   Combined form of age-related cataract, left eye  01/11/2024 10:55 AM EST     Procedures  - documented in this encounter  Procedures  Procedure Name Priority Date/Time Associated Diagnosis Comments   IOL BIOMETRY W/ IOL CALC OU (BOTH EYES) Routine 01/08/2024 1:37 PM EST Combined form of age-related cataract, left eye   Combined form of age-related cataract, right eye  Results  for this procedure are in the results section.      Imaging Results  - documented in this encounter  IOL BIOMETRY W/ IOL CALC OU (BOTH EYES) (01/08/2024 1:37 PM EST)  Imaging Results - IOL BIOMETRY W/ IOL CALC OU (BOTH EYES) (01/08/2024 1:37 PM EST)  Anatomical Region Laterality Modality       Other     Imaging Results - IOL BIOMETRY W/ IOL CALC OU (BOTH EYES) (01/08/2024 1:37 PM EST)  Narrative   01/08/2024 1:37 PM EST   Date of Procedure  1/8/2024.    Notes  Measurements only - see Procedure Record under Scanned Documents for  signed results.       Imaging Results - IOL BIOMETRY W/ IOL CALC OU (BOTH EYES) (01/08/2024 1:37 PM EST)  Authorizing Provider Result Type   Dmitri Kimball MD OPHTHALMOLOGY     Associated Images       1/8/2024  IOL BIOMETRY W/ IOL CALC OU (BOTH EYES)     Visit Diagnoses  - documented in this encounter  Visit Diagnoses  Diagnosis   Combined form of age-related cataract, left eye - Primary    Combined form of age-related cataract, right eye    Early dry stage nonexudative age-related macular degeneration of both eyes    Visual field loss, left eye    Visual field defect, unspecified    Essential hypertension   Unspecified essential hypertension    Anxiety disorder, unspecified type      Eye Exam    Eye Exam - Visual Acuity (Snellen - Linear)  Visual Acuity (Snellen - Linear)    Right eye Left eye   Dist cc 20/50 20/80   Dist ph cc NI 20/40   Near cc unable J12     Eye Exam  Correction: Glasses     Eye Exam - Tonometry (Applanation, 1:16 PM)  Tonometry (Applanation, 1:16 PM)    Right eye Left eye   Pressure 14 14     Eye Exam - Pupils  Pupils    Pupils Dark Light Shape React APD   Right eye PERRL 4 2 Round +2 None   Left eye PERRL 4 2 Round +2 None     Eye Exam - Visual Fields (Counting fingers)  Visual Fields (Counting fingers)    Right eye Left eye     Full Full     Eye Exam - Extraocular Movement  Extraocular Movement    Right eye Left eye     Full Full     Eye Exam -  Neuro/Psych  Neuro/Psych  Oriented x3: Yes   Mood/Affect: Normal      LENSTAR  Right eye: AL 23.68 ACD 2.91 WTW 11.82  Left eye: AL 23.58 ACD 3.19 WTW 12.05       Eye Exam - External Exam  External Exam    Right eye Left eye   External Normal including orbits and preauricular lymph nodes Normal including orbits and preauricular lymph nodes     Eye Exam - Slit Lamp Exam  Slit Lamp Exam    Right eye Left eye   Lids/Lashes Upper lid dermatochalasis Upper lid dermatochalasis   Conjunctiva/Sclera White and quiet White and quiet   Cornea Punctate epithelial keratitis Punctate epithelial keratitis   Anterior Chamber Deep and quiet Deep and quiet   Iris Round and reactive Round and reactive   Lens 3+ Nuclear sclerotic cataract, 3+ Posterior subcapsular cataract 3+ Nuclear sclerotic cataract, 3+ Posterior subcapsular cataract   Anterior Vitreous Normal Normal     Eye Exam - Fundus Exam  Fundus Exam    Right eye Left eye   Disc Normal Normal   C/D Ratio 0.2 0.2   Macula Retinal pigment epithelial mottling, Drusen Retinal pigment epithelial mottling, Drusen   Vessels Normal Normal   Periphery Normal Normal     Eye Exam - Wearing Rx  Wearing Rx    Sphere Cylinder Axis Add   Right eye -2.25 +1.50 007 +2.50   Left eye -2.75 +1.00 010 +2.50     Eye Exam  Type: PAL     Care Teams  - documented as of this encounter  Care Teams  Team Member Relationship Specialty Start Date End Date   Tata Billingsley DO   NPI: [id number]   [address] [facility] [place] [address]   [phone] (Work)   [phone] (Fax)  PCP - General Internal Medicine 12/29/23     Sara Thibodeaux OD   NPI: [id number]   [address] [place] [address]   [phone] (Work)   [phone] (Fax)  Referring Optometry 12/29/23

## 2024-01-11 NOTE — ANESTHESIA PREPROCEDURE EVALUATION
Patient: Kashif Capps    Procedure Information       Date/Time: 01/11/24 1020    Procedure: Phacoemulsification Cataract with Insertion Intraocular Lens (Left: Eye)    Location: [facility] OR  / [facility] OR    Surgeons: Dmitri Kimball MD            Relevant Problems   Anesthesia (within normal limits)      Cardiovascular   (+) Hypertension, benign      GI   (+) Hiatal hernia      Neuro/Psych   (+) Anxiety      Musculoskeletal   (+) Osteoarthritis of right hip       Clinical information reviewed:   Tobacco  Allergies  Meds   Med Hx  Surg Hx   Fam Hx          NPO Detail:  NPO/Void Status  Carbohydrate Drink Given Prior to Surgery? : N  Date of Last Liquid: 01/11/24  Time of Last Liquid: 0700 (sip of water with meds)  Date of Last Solid: 01/10/24  Time of Last Solid: 2300  Last Intake Type: Clear fluids  Time of Last Void: 0700         Physical Exam    Airway  Mallampati: II  TM distance: >3 FB  Neck ROM: full     Cardiovascular   Rhythm: regular  Rate: normal     Dental   Comments: Missing tooth/ Implant in progress   Pulmonary   Breath sounds clear to auscultation     Abdominal        MAC discussed with Patient.  Plan and process discussed for anesthesia for procedure.  Risks and benefits discussed.  Need for cooperation with the surgeon for possible block per surgeon as well as the procedure. All questions answered with patient.  The patient understands plan and wishes to proceed.      Anesthesia Plan    History of general anesthesia?: yes  History of complications of general anesthesia?: no    ASA 2     MAC     The patient is not a current smoker.    intravenous induction   Anesthetic plan and risks discussed with patient.

## 2024-01-11 NOTE — DISCHARGE INSTRUCTIONS
Please see enclosed instructions from Dr. Kimball regarding eye drop schedule, restrictions and use of eye shield.    Please take bag with eye drops that were given to you today as well as ALL eye drops that you are using at home with you to your appointment tomorrow at Dr. Kimball's office.     Please see enclosed sedation information

## 2024-01-11 NOTE — ANESTHESIA POSTPROCEDURE EVALUATION
Patient: Kashif Capps    Procedure Summary       Date: 01/11/24 Room / Location: [facility] OR 05 / [facility]    Anesthesia Start: 1019 Anesthesia Stop: 1047    Procedure: Phacoemulsification Cataract with Insertion Intraocular Lens (Left: Eye) Diagnosis:       Combined forms of age-related cataract of left eye      (Combined forms of age-related cataract of left eye [H25.812])    Surgeons: Dmitri Kimball MD Responsible Provider: Celio Tran DO    Anesthesia Type: MAC ASA Status: 2            Anesthesia Type: MAC    Vitals Value Taken Time   /73 01/11/24 1045   Temp 36.6 °C (97.8 °F) 01/11/24 1045   Pulse 74 01/11/24 1045   Resp 18 01/11/24 1045   SpO2 98 % 01/11/24 1045       Anesthesia Post Evaluation    Patient location during evaluation: bedside  Patient participation: complete - patient participated  Level of consciousness: awake  Pain score: 0  Pain management: adequate  Multimodal analgesia pain management approach  Airway patency: patent  Cardiovascular status: acceptable  Respiratory status: acceptable  Hydration status: acceptable  Postoperative Nausea and Vomiting: none  Comments: Easily awakens.  VSS.  Denies pain or nausea.        No notable events documented.  Tolerated well.

## 2024-02-08 ENCOUNTER — OFFICE VISIT (OUTPATIENT)
Dept: PRIMARY CARE | Facility: CLINIC | Age: 84
End: 2024-02-08
Payer: MEDICARE

## 2024-02-08 VITALS
HEART RATE: 85 BPM | DIASTOLIC BLOOD PRESSURE: 79 MMHG | HEIGHT: 72 IN | BODY MASS INDEX: 21.4 KG/M2 | WEIGHT: 158 LBS | SYSTOLIC BLOOD PRESSURE: 139 MMHG

## 2024-02-08 DIAGNOSIS — I73.9 PERIPHERAL VASCULAR DISEASE, UNSPECIFIED (CMS-HCC): ICD-10-CM

## 2024-02-08 DIAGNOSIS — I10 HYPERTENSION, BENIGN: ICD-10-CM

## 2024-02-08 DIAGNOSIS — N39.3 STRESS INCONTINENCE OF URINE: Primary | ICD-10-CM

## 2024-02-08 DIAGNOSIS — F41.9 ANXIETY: ICD-10-CM

## 2024-02-08 PROCEDURE — 3075F SYST BP GE 130 - 139MM HG: CPT | Performed by: INTERNAL MEDICINE

## 2024-02-08 PROCEDURE — 99214 OFFICE O/P EST MOD 30 MIN: CPT | Performed by: INTERNAL MEDICINE

## 2024-02-08 PROCEDURE — 1160F RVW MEDS BY RX/DR IN RCRD: CPT | Performed by: INTERNAL MEDICINE

## 2024-02-08 PROCEDURE — 3078F DIAST BP <80 MM HG: CPT | Performed by: INTERNAL MEDICINE

## 2024-02-08 PROCEDURE — 1126F AMNT PAIN NOTED NONE PRSNT: CPT | Performed by: INTERNAL MEDICINE

## 2024-02-08 PROCEDURE — 1159F MED LIST DOCD IN RCRD: CPT | Performed by: INTERNAL MEDICINE

## 2024-02-08 RX ORDER — ALPRAZOLAM 0.5 MG/1
0.5 TABLET ORAL DAILY PRN
Qty: 90 TABLET | Refills: 0 | Status: SHIPPED | OUTPATIENT
Start: 2024-02-08 | End: 2024-05-08 | Stop reason: SDUPTHER

## 2024-02-08 RX ORDER — FLUOROMETHOLONE 1 MG/ML
1 SUSPENSION/ DROPS OPHTHALMIC 3 TIMES DAILY
COMMUNITY
Start: 2024-01-24

## 2024-02-08 RX ORDER — PREDNISOLONE ACETATE 10 MG/ML
1 SUSPENSION/ DROPS OPHTHALMIC 4 TIMES DAILY
COMMUNITY
Start: 2024-01-24

## 2024-02-08 RX ORDER — KETOROLAC TROMETHAMINE 5 MG/ML
1 SOLUTION OPHTHALMIC 4 TIMES DAILY
COMMUNITY
Start: 2024-01-24

## 2024-02-08 RX ORDER — OXYBUTYNIN CHLORIDE 5 MG/1
5 TABLET ORAL 2 TIMES DAILY
Qty: 60 TABLET | Refills: 5 | Status: SHIPPED | OUTPATIENT
Start: 2024-02-08 | End: 2024-08-06

## 2024-02-08 ASSESSMENT — ENCOUNTER SYMPTOMS
CHILLS: 0
COUGH: 0
SINUS PAIN: 0
SORE THROAT: 0
ABDOMINAL DISTENTION: 0
WEAKNESS: 0
APPETITE CHANGE: 0
SINUS PRESSURE: 0
NAUSEA: 0
ACTIVITY CHANGE: 0
NUMBNESS: 0
SHORTNESS OF BREATH: 0
BACK PAIN: 0
WHEEZING: 0
VOMITING: 0
FATIGUE: 0
DIARRHEA: 0

## 2024-02-08 NOTE — PROGRESS NOTES
Subjective   Patient ID: Kashif Capps is a 83 y.o. male who presents for Follow-up (3 month).  HPIPatient is here today for 3-month follow-up    Patient reports that the bladder medicine I gave him did seem to help but he is still having incontinence especially after drinking coffee or alcohol.  He has run out of the medication    Otherwise he is doing well with no concerns    Review of Systems   Constitutional:  Negative for activity change, appetite change, chills and fatigue.   HENT:  Negative for congestion, postnasal drip, sinus pressure, sinus pain and sore throat.    Respiratory:  Negative for cough, shortness of breath and wheezing.    Cardiovascular:  Negative for chest pain and leg swelling.   Gastrointestinal:  Negative for abdominal distention, diarrhea, nausea and vomiting.   Genitourinary:  Positive for urgency.   Musculoskeletal:  Negative for back pain.   Neurological:  Negative for weakness and numbness.       Objective   /79   Pulse 85   Ht 1.829 m (6')   Wt 71.7 kg (158 lb)   BMI 21.43 kg/m²     Physical Exam  Constitutional:       Appearance: Normal appearance.   Neurological:      Mental Status: He is alert.   Psychiatric:         Mood and Affect: Mood normal.         Behavior: Behavior normal.         Thought Content: Thought content normal.         OARRS:  No data recorded  I have personally reviewed the OARRS report for Kashif Capps. I have considered the risks of abuse, dependence, addiction and diversion and I believe that it is clinically appropriate for Kashif Capps to be prescribed this medication     Is the patient prescribed a combination of a benzodiazepine and opioid?  No     Last Urine Drug Screen / ordered today: Yes  Recent Results        Component  Ref Range & Units 3 mo ago   Clonazepam  <25 ng/mL <25   7-Aminoclonazepam  <25 ng/mL <25   Alprazolam  <25 ng/mL 44 High    Comment: Consistent with use of a drug containing alprazolam, such as Xanax.    Alpha-Hydroxyalprazolam  <25 ng/mL 79 High    Comment: Alprazolam metabolite; consistent with use of a drug containing alprazolam, such as Xanax.   Midazolam  <25 ng/mL <25   Alpha-Hydroxymidazolam  <25 ng/mL <25   Chlordiazepoxide  <25 ng/mL <25   Diazepam  <25 ng/mL <25   Nordiazepam  <25 ng/mL <25   Temazepam  <25 ng/mL <25   Oxazepam  <25 ng/mL <25   Lorazepam  <25 ng/mL <25   Methadone  <25 ng/mL <25   EDDP  <25 ng/mL <25   6-Acetylmorphine  <25 ng/mL <25   Codeine  <50 ng/mL <50   Hydrocodone  <25 ng/mL <25   Hydromorphone  <25 ng/mL <25   Morphine  <50 ng/mL <50   Norhydrocodone  <25 ng/mL <25   Noroxycodone  <25 ng/mL <25   Oxycodone  <25 ng/mL <25   Oxymorphone  <25 ng/mL <25   Fentanyl  <2.5 ng/mL <2.5   Norfentanyl  <2.5 ng/mL <2.5   Tramadol  <50 ng/mL <50   O-Desmethyltramadol  <50 ng/mL <50   Zolpidem  <25 ng/mL <25   Zolpidem Metabolite (ZCA)  <25 ng/mL <25   Resulting Agency [facility]              Narrative  Performed by: [facility]  The performance characteristics of this test has  been validated by the individual  laboratory site where  testing is performed. It has not been cleared or approved  by the FDA. However the FDA has determined that such clearance  or approval is not necessary. Our Laboratory is certified under  the Clinical Laboratory Improvement Amendments of 1988 (CLIA)  as qualified to perform high complexity clinical laboratory testing.                 Controlled Substance Agreement:  Date of the Last Agreement: 11/08/2023  Reviewed Controlled Substance Agreement including but not limited to the benefits, risks, and alternatives to treatment with a Controlled Substance medication(s).     Benzodiazepines:  What is the patient's goal of therapy? Improvement of anxiety   Is this being achieved with current treatment? yes     JEANNETTE-7:  No data recorded     Activities of Daily Living:   Is your overall impression that this patient is benefiting (symptom reduction outweighs side effects) from  benzodiazepine therapy? Yes      1. Physical Functioning: Better  2. Family Relationship: Better  3. Social Relationship: Better  4. Mood: Better  5. Sleep Patterns: Better  6. Overall Function: Better           Assessment/Plan   Problem List Items Addressed This Visit       Anxiety     1. Anxiety   - uses benzo prn  - CSA and UDS up to date   - I have personally reviewed this patient's OARRS report and found it to be appropriate. The report has been uploaded into the medical record. I have considered the risks of abuse, addiction, dependence, and diversion and feel that it is clinically appropriate for this patient to be prescribed this controlled medication.     2. HTN , controlled  - continue norvasc-benazapril 10-20 1 tab po daily       3. GERD   - continue Nexium 20mg po daily     4.  Urinary incontinence  -Recommend referral to urology for evaluation  Final diagnoses:   [F41.9] Anxiety

## 2024-02-12 ENCOUNTER — PREP FOR PROCEDURE (OUTPATIENT)
Dept: OPERATING ROOM | Facility: HOSPITAL | Age: 84
End: 2024-02-12
Payer: MEDICARE

## 2024-02-12 DIAGNOSIS — H25.811 COMBINED FORMS OF AGE-RELATED CATARACT OF RIGHT EYE: Primary | ICD-10-CM

## 2024-02-12 RX ORDER — POVIDONE-IODINE 5 %
SOLUTION, NON-ORAL OPHTHALMIC (EYE) ONCE
Status: CANCELLED | OUTPATIENT
Start: 2024-02-29 | End: 2024-02-12

## 2024-02-12 RX ORDER — TROPICAMIDE 10 MG/ML
1 SOLUTION/ DROPS OPHTHALMIC ONCE
Status: CANCELLED | OUTPATIENT
Start: 2024-02-29 | End: 2024-02-12

## 2024-02-12 RX ORDER — TETRACAINE HYDROCHLORIDE 5 MG/ML
1 SOLUTION OPHTHALMIC ONCE
Status: CANCELLED | OUTPATIENT
Start: 2024-02-29 | End: 2024-02-12

## 2024-02-12 RX ORDER — PHENYLEPHRINE HYDROCHLORIDE 100 MG/ML
1 SOLUTION/ DROPS OPHTHALMIC ONCE
Status: CANCELLED | OUTPATIENT
Start: 2024-02-29 | End: 2024-02-12

## 2024-02-12 RX ORDER — PREDNISOLONE ACETATE 10 MG/ML
1 SUSPENSION/ DROPS OPHTHALMIC ONCE
Status: CANCELLED | OUTPATIENT
Start: 2024-02-29 | End: 2024-02-12

## 2024-02-12 RX ORDER — KETOROLAC TROMETHAMINE 5 MG/ML
1 SOLUTION OPHTHALMIC
Status: CANCELLED | OUTPATIENT
Start: 2024-02-29 | End: 2024-02-29

## 2024-02-13 PROBLEM — H25.811 COMBINED FORMS OF AGE-RELATED CATARACT OF RIGHT EYE: Status: ACTIVE | Noted: 2024-02-12

## 2024-02-27 NOTE — PREPROCEDURE INSTRUCTIONS
No outpatient medications have been marked as taking for the 2/29/24 encounter (Hospital Encounter).                       NPO Instructions:  Nothing to eat or drink after midnight      Additional Instructions:     Will need  home, will receive call day before surgery with arrival time

## 2024-02-29 ENCOUNTER — ANESTHESIA (OUTPATIENT)
Dept: OPERATING ROOM | Facility: HOSPITAL | Age: 84
End: 2024-02-29
Payer: MEDICARE

## 2024-02-29 ENCOUNTER — ANESTHESIA EVENT (OUTPATIENT)
Dept: OPERATING ROOM | Facility: HOSPITAL | Age: 84
End: 2024-02-29
Payer: MEDICARE

## 2024-02-29 ENCOUNTER — HOSPITAL ENCOUNTER (OUTPATIENT)
Facility: HOSPITAL | Age: 84
Setting detail: OUTPATIENT SURGERY
Discharge: HOME | End: 2024-02-29
Attending: OPHTHALMOLOGY | Admitting: OPHTHALMOLOGY
Payer: MEDICARE

## 2024-02-29 VITALS
DIASTOLIC BLOOD PRESSURE: 84 MMHG | SYSTOLIC BLOOD PRESSURE: 129 MMHG | OXYGEN SATURATION: 99 % | RESPIRATION RATE: 20 BRPM | WEIGHT: 159.39 LBS | HEIGHT: 72 IN | TEMPERATURE: 98.2 F | BODY MASS INDEX: 21.59 KG/M2 | HEART RATE: 87 BPM

## 2024-02-29 PROCEDURE — 2500000004 HC RX 250 GENERAL PHARMACY W/ HCPCS (ALT 636 FOR OP/ED): Performed by: ANESTHESIOLOGY

## 2024-02-29 PROCEDURE — 3700000002 HC GENERAL ANESTHESIA TIME - EACH INCREMENTAL 1 MINUTE: Performed by: OPHTHALMOLOGY

## 2024-02-29 PROCEDURE — 2500000001 HC RX 250 WO HCPCS SELF ADMINISTERED DRUGS (ALT 637 FOR MEDICARE OP): Performed by: OPHTHALMOLOGY

## 2024-02-29 PROCEDURE — 3700000001 HC GENERAL ANESTHESIA TIME - INITIAL BASE CHARGE: Performed by: OPHTHALMOLOGY

## 2024-02-29 PROCEDURE — 3600000008 HC OR TIME - EACH INCREMENTAL 1 MINUTE - PROCEDURE LEVEL THREE: Performed by: OPHTHALMOLOGY

## 2024-02-29 PROCEDURE — 3600000003 HC OR TIME - INITIAL BASE CHARGE - PROCEDURE LEVEL THREE: Performed by: OPHTHALMOLOGY

## 2024-02-29 PROCEDURE — 2720000007 HC OR 272 NO HCPCS: Performed by: OPHTHALMOLOGY

## 2024-02-29 PROCEDURE — 2760000001 HC OR 276 NO HCPCS: Performed by: OPHTHALMOLOGY

## 2024-02-29 PROCEDURE — C1780 LENS, INTRAOCULAR (NEW TECH): HCPCS | Performed by: OPHTHALMOLOGY

## 2024-02-29 PROCEDURE — 7100000010 HC PHASE TWO TIME - EACH INCREMENTAL 1 MINUTE: Performed by: OPHTHALMOLOGY

## 2024-02-29 PROCEDURE — 7100000009 HC PHASE TWO TIME - INITIAL BASE CHARGE: Performed by: OPHTHALMOLOGY

## 2024-02-29 DEVICE — IMPLANTABLE DEVICE: Type: IMPLANTABLE DEVICE | Site: EYE | Status: FUNCTIONAL

## 2024-02-29 RX ORDER — SODIUM CHLORIDE, SODIUM LACTATE, POTASSIUM CHLORIDE, CALCIUM CHLORIDE 600; 310; 30; 20 MG/100ML; MG/100ML; MG/100ML; MG/100ML
50 INJECTION, SOLUTION INTRAVENOUS CONTINUOUS
Status: DISCONTINUED | OUTPATIENT
Start: 2024-02-29 | End: 2024-02-29 | Stop reason: HOSPADM

## 2024-02-29 RX ORDER — TETRACAINE HYDROCHLORIDE 5 MG/ML
1 SOLUTION OPHTHALMIC ONCE
Status: COMPLETED | OUTPATIENT
Start: 2024-02-29 | End: 2024-02-29

## 2024-02-29 RX ORDER — MIDAZOLAM HYDROCHLORIDE 1 MG/ML
1 INJECTION INTRAMUSCULAR; INTRAVENOUS ONCE
Status: COMPLETED | OUTPATIENT
Start: 2024-02-29 | End: 2024-02-29

## 2024-02-29 RX ORDER — PREDNISOLONE ACETATE 10 MG/ML
1 SUSPENSION/ DROPS OPHTHALMIC ONCE
Status: DISCONTINUED | OUTPATIENT
Start: 2024-02-29 | End: 2024-02-29 | Stop reason: HOSPADM

## 2024-02-29 RX ORDER — FENTANYL CITRATE 50 UG/ML
INJECTION, SOLUTION INTRAMUSCULAR; INTRAVENOUS AS NEEDED
Status: DISCONTINUED | OUTPATIENT
Start: 2024-02-29 | End: 2024-02-29

## 2024-02-29 RX ORDER — POVIDONE-IODINE 5 %
SOLUTION, NON-ORAL OPHTHALMIC (EYE) ONCE
Status: DISCONTINUED | OUTPATIENT
Start: 2024-02-29 | End: 2024-02-29 | Stop reason: HOSPADM

## 2024-02-29 RX ORDER — MIDAZOLAM HYDROCHLORIDE 1 MG/ML
INJECTION, SOLUTION INTRAMUSCULAR; INTRAVENOUS AS NEEDED
Status: DISCONTINUED | OUTPATIENT
Start: 2024-02-29 | End: 2024-02-29

## 2024-02-29 RX ORDER — KETOROLAC TROMETHAMINE 5 MG/ML
1 SOLUTION OPHTHALMIC
Status: COMPLETED | OUTPATIENT
Start: 2024-02-29 | End: 2024-02-29

## 2024-02-29 RX ADMIN — POLYVINYL ALCOHOL, POVIDONE 1 DROP: 14; 6 SOLUTION/ DROPS OPHTHALMIC at 12:10

## 2024-02-29 RX ADMIN — FENTANYL CITRATE 50 MCG: 50 INJECTION, SOLUTION INTRAMUSCULAR; INTRAVENOUS at 13:01

## 2024-02-29 RX ADMIN — PHENYLEPHRINE HYDROCHLORIDE 1 DROP: 100 SOLUTION/ DROPS OPHTHALMIC at 12:19

## 2024-02-29 RX ADMIN — POLYVINYL ALCOHOL, POVIDONE 1 DROP: 14; 6 SOLUTION/ DROPS OPHTHALMIC at 12:04

## 2024-02-29 RX ADMIN — KETOROLAC TROMETHAMINE 1 DROP: 5 SOLUTION OPHTHALMIC at 12:36

## 2024-02-29 RX ADMIN — PHENYLEPHRINE HYDROCHLORIDE 1 DROP: 100 SOLUTION/ DROPS OPHTHALMIC at 12:10

## 2024-02-29 RX ADMIN — PHENYLEPHRINE HYDROCHLORIDE 1 DROP: 100 SOLUTION/ DROPS OPHTHALMIC at 12:36

## 2024-02-29 RX ADMIN — MIDAZOLAM 1 MG: 1 INJECTION INTRAMUSCULAR; INTRAVENOUS at 12:48

## 2024-02-29 RX ADMIN — POLYVINYL ALCOHOL, POVIDONE 1 DROP: 14; 6 SOLUTION/ DROPS OPHTHALMIC at 12:18

## 2024-02-29 RX ADMIN — POLYVINYL ALCOHOL, POVIDONE 1 DROP: 14; 6 SOLUTION/ DROPS OPHTHALMIC at 12:36

## 2024-02-29 RX ADMIN — TETRACAINE HYDROCHLORIDE 1 DROP: 5 SOLUTION OPHTHALMIC at 12:03

## 2024-02-29 RX ADMIN — FENTANYL CITRATE 50 MCG: 50 INJECTION, SOLUTION INTRAMUSCULAR; INTRAVENOUS at 12:48

## 2024-02-29 RX ADMIN — KETOROLAC TROMETHAMINE 1 DROP: 5 SOLUTION OPHTHALMIC at 12:18

## 2024-02-29 RX ADMIN — MIDAZOLAM HYDROCHLORIDE 1 MG: 1 INJECTION, SOLUTION INTRAMUSCULAR; INTRAVENOUS at 12:36

## 2024-02-29 RX ADMIN — MIDAZOLAM 1 MG: 1 INJECTION INTRAMUSCULAR; INTRAVENOUS at 12:58

## 2024-02-29 RX ADMIN — KETOROLAC TROMETHAMINE 1 DROP: 5 SOLUTION OPHTHALMIC at 12:10

## 2024-02-29 RX ADMIN — KETOROLAC TROMETHAMINE 1 DROP: 5 SOLUTION OPHTHALMIC at 12:04

## 2024-02-29 RX ADMIN — PHENYLEPHRINE HYDROCHLORIDE 1 DROP: 100 SOLUTION/ DROPS OPHTHALMIC at 12:04

## 2024-02-29 SDOH — HEALTH STABILITY: MENTAL HEALTH: CURRENT SMOKER: 0

## 2024-02-29 ASSESSMENT — PAIN SCALES - GENERAL
PAINLEVEL_OUTOF10: 0 - NO PAIN
PAINLEVEL_OUTOF10: 0 - NO PAIN

## 2024-02-29 ASSESSMENT — PAIN - FUNCTIONAL ASSESSMENT
PAIN_FUNCTIONAL_ASSESSMENT: 0-10
PAIN_FUNCTIONAL_ASSESSMENT: 0-10

## 2024-02-29 ASSESSMENT — COLUMBIA-SUICIDE SEVERITY RATING SCALE - C-SSRS
2. HAVE YOU ACTUALLY HAD ANY THOUGHTS OF KILLING YOURSELF?: NO
6. HAVE YOU EVER DONE ANYTHING, STARTED TO DO ANYTHING, OR PREPARED TO DO ANYTHING TO END YOUR LIFE?: NO
1. IN THE PAST MONTH, HAVE YOU WISHED YOU WERE DEAD OR WISHED YOU COULD GO TO SLEEP AND NOT WAKE UP?: NO

## 2024-02-29 NOTE — DISCHARGE INSTRUCTIONS
Please see enclosed instructions from Dr. Kimball regarding eye drop schedule, restrictions and use of eye shield.    Please take bag with eye drops that were given to you today as well as ALL eye drops that you are using at home with you to your appointment tomorrow at Dr. Kimball's office.

## 2024-02-29 NOTE — H&P
H&P Notes  - documented in this encounter  Dmitri Kimball MD - 02/06/2024 9:47 AM EST  Formatting of this note is different from the original.  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  HPI: Associated symptoms  Blurry vision, difficulty reading small print, glare and halos around lights at night continues right eye    PAST MEDICAL HISTORY  Diagnosis Date  Anxiety  Erectile dysfunction  GERD (gastroesophageal reflux disease)  Hypertension    PAST SURGICAL HISTORY  Procedure Laterality Date  REMV CATARACT EXTRACAP,INSERT LENS Left 01/11/2024  [id number] +17.5 D    FAMILY HISTORY  Problem Relation Age of Onset  Macular Degen Mother    SOCIAL HISTORY:  Social History    Tobacco Use  Smoking status: Every Day  Packs/day: 2.00  Years: 65.00  Additional pack years: 0.00  Total pack years: 130.00  Types: Cigarettes  Smokeless tobacco: Never    MEDICATIONS:  Prior to Admission medications as of 2/6/24 0940  Medication Sig Last Dose Taking  prednisoLONE acetate (PRED FORTE) 1 % ophthalmic suspension Use 1 Drop in the left eye four times daily. Taking Yes  keTORolac (ACULAR) 0.5 % ophthalmic solution Use 1 Drop in the left eye four times daily. Taking Yes  fluorometholone (FML LIQUID FILM) 0.1 % ophthalmic suspension Use 1 Drop in the right eye three times a day. Taking Yes  amLODIPine-benazepril (LOTREL) 10-20 mg per capsule Take 1 capsule by mouth once daily. Taking Yes  ALPRAZolam (XANAX) 0.5 mg tablet Take 1 tablet by mouth at bedtime as needed. Taking Yes  esomeprazole (NEXIUM) 40 mg capsule  Take 1 capsule by mouth once daily. Taking Yes  sildenafil (VIAGRA) 100 mg tablet Take 1 tablet by mouth as needed.    No medication comments found.    CURRENT ALLERGIES:  ALLERGIES  Allergen Reactions  Levofloxacin Other: See Comments    REVIEW OF SYSTEMS:  PAIN ASSESSMENT:  General: No weight loss, malaise or fevers.  Neuro: No Hx of stroke or seizures  Respiratory: No history of current cough or dyspnea, or pneumonia in the past 6 weeks. No history of respiratory/pulmonary symptoms or problems  Cardiovascular: Positive for: Hypertension  GI: No history of GI symptoms or problems. No history of esophageal varices, recent ascites, or ETOH greater than 2 drinks per day.  : No history of UTI in past 6 weeks. No history of renal failure. Not currently on or requiring dialysis. No history of  symptoms or problems.  GYN: N/A  Pregnancy: N/A  Endocrine: No history of diabetes. Has not taken steroids within the past 30 days. No history of endocrinological symptoms or problems.  Hematology: No history of bleeding or clotting disorder. Pt is not taking anti-coagulation or platelet medications. No history of hematological symptoms or problems.  Oncology: No history of CA metastasis, chemo within 30 days, or radiotherapy within 90 days. Has not lost 10% of body wt in 6 months. No history of oncological symptoms or problems.  Psych: Anxiety  Musculoskeletal: Negative for joint pain or swelling, back pain or muscle pain.  Skin: Negative for lesions, rash and itching.    PHYSICAL EXAM:  VITALS:  /83  Pulse 77        General: Alert and oriented  Skin: Normal color, no rash, no lesions.  HEENT: EOM, pupils equal, round and reactive.  Cardiovascular: Normal S1 & S2, no rubs, murmurs or gallops. No JVD. Pulse regular.  Lungs: Normal breath sounds, no wheezes or crackles.  Abdomen: Soft, non-tender, no rigidity.  Extremities: No deformity, no edema or tenderness, no joint swelling or clubbing.  Neurological: Normal  cognition and motor skills.  Pulses: Carotid and radial pulses normal +2.    Diagnostic tests reviewed for today's visit:  No new labs    ASSESSMENT  Medication and Non-Pharmacologic VTE Prophylaxis/Anticoagulants      VTE Prophylaxis: VTE prophylaxis appropriate    Impression: There is no known pertinent medical condition which may affect joel-operative course    Clinical Risk Factors for Possible Cardiac Complications:  None    Patient is scheduled for a low-risk procedure.    FUNCTIONAL STATUS: Walk indoors, such as around the house (1.75 METs)  Do light work around the house, such as dusting or washing dishes (2.70 METs)  Take care of self, that is eating, dressing, bathing, using the toilet (2.75 METs)    Functional Class (NYHA): N/A    [facility]: Not obtained    PLAN  CONSULTS:  Patient does not require consults for optimization at this time.    The Following Tests/Procedures Have Been Initiated:  None    Instructions Given to Patient:  Patient given verbal and written preop instructions and voices comprehension and compliance.    SIGNATURE: Dmitri Kimball MD PATIENT NAME: Kashif Capps  DATE: February 06, 2024 MRN: [id number]  TIME: 9:48 AM PAGER/CONTACT #:    Electronically signed by Dmitri Kimball MD at 02/06/2024 10:17 AM EST   Source Comments  - [facility]  In the event this information is protected by the Federal Confidentiality of Alcohol and Drug Abuse Patient Records regulations: This information has been disclosed to you from records protected by Federal confidentiality rules (42 CFR Part 2). The Federal rules prohibit you from making any further disclosure of this information unless further disclosure is expressly permitted by the written consent of the person to whom it pertains or as otherwise permitted by 42 CFR Part 2. A general authorization for the release of medical or other information is NOT sufficient for this purpose. The Federal rules restrict any use of the information to  criminally investigate or prosecute any alcohol or drug abuse patient.  Reason for Visit    Reason for Visit -  Reason Comments   Cataract Follow Up Right eye   Post-op Cataract OS 01/11/2024     Encounter Details    Encounter Details  Date Type Department Care Team (Latest Contact Info) Description   02/06/2024 9:15 AM EST Office Visit [facility] Ophthalmology   [address] [id number]   [phone]  Dmitri Kimball MD   [address] [id number]   [phone] (Work)   [phone] (Fax)  Combined form of age-related cataract, right eye (Primary Dx);  Status post cataract extraction and insertion of intraocular lens of left eye;  Nonexudative age-related macular degeneration, bilateral, early dry stage;  Quadrantic scotoma of left eye/superior temporal;  Primary hypertension;  Anxiety     Social History  - documented as of this encounter  Social History  Tobacco Use Types Packs/Day Years Used Date   Smoking Tobacco: Every Day Cigarettes 2 65     Smokeless Tobacco: Never             Social History  Tobacco Cessation: Ready to Quit: Not Asked; Counseling Given: Not Answered     Social History  Alcohol Use Standard Drinks/Week Comments   Not Asked 0 (1 standard drink = 0.6 oz pure alcohol) occasional     Social History  Sex and Gender Information Value Date Recorded   Sex Assigned at Birth Not on file     Gender Identity Not on file     Sexual Orientation Not on file       Last Filed Vital Signs  - documented in this encounter  Last Filed Vital Signs  Vital Sign Reading Time Taken Comments   Blood Pressure 137/83 02/06/2024 9:41 AM EST     Pulse 77 02/06/2024 9:41 AM EST     Temperature - -     Respiratory Rate - -     Oxygen Saturation - -     Inhaled Oxygen Concentration - -     Weight - -     Height - -     Body Mass Index - -       Functional Status  - documented as of this encounter  Functional Status  Functional Status Response Date of Assessment   Are you deaf or do you have serious  difficulty hearing? No 12/12/2014   Are you blind or do you have serious difficulty seeing, even when wearing glasses? No 12/12/2014   Do you have serious difficulty walking or climbing stairs? No 12/12/2014   Do you have difficulty dressing or bathing? No 12/12/2014   Because of a physical, mental, or emotional condition, do you have difficulty doing errands alone such as visiting a doctor's office or shopping? No 12/12/2014     Functional Status  Cognitive Status Response Date of Assessment   Because of a physical, mental, or emotional condition, do you have serious difficulty concentrating, remembering, or making decisions? No 12/12/2014     Patient Instructions  - documented in this encounter  Patient Instructions  Dmitri Kimball MD - 02/06/2024 9:46 AM EST   Formatting of this note might be different from the original.      Systane Complete Artificial Tears - Use 1 Drop into both eyes three times a day.  FML 1 drop in the right eye three times daily    Continue:  Acular 1 drop in the left eye three times daily until 02/15/2024- Gray lid  Pred Forte 1 drop in the left eye three times daily until 02/15/2024- Pink lid    If you have any questions please contact our office at [phone].  After office hours or on the weekend, please call Dr. Kimball on his cell phone at [phone].  Electronically signed by Dmitri Kimball MD at 02/06/2024 9:47 AM EST     Progress Notes  - documented in this encounter  Dmitri Kimball MD - 02/06/2024 9:41 AM EST  Formatting of this note might be different from the original.  ASSESSMENT/PLAN:  1. Combined form of age-related cataract, right eye - ICD9: 366.19, ICD10: H25.811 (primary diagnosis)    PHYSICAL EXAM:    Vital Signs:  Blood pressure 137/83, pulse 77.    Respiratory:  Normal breath sounds, no wheezing.    CARD:  Normal heart sounds 1 & 2, normal sinus rhythm.      Cataract Presurgical Documentation    Cataract: Right eye (OD)    Current Visual Acuity  Right Eye  Distance CC 20/50  Left Eye Distance SC 20/25    Visual Function: Kashif Capps states that the decline in vision from the cataract impedes his abilities as listed in the HPI, as well as other activities of daily living.    Kashif Capps has confirmed that he is no longer able to function adequately on a day-to-day basis because of his current visual condition.    Further, it is my medical opinion that the cataract is the primary cause, or at least a significantly contributory cause of his visual dysfunction. With uncomplicated cataract surgery and lens implantation, it is my expectation that his visual function and quality of life will improve, significantly.    The risks, benefits, alternatives, personnel and complications of cataract surgery with lens implantation were discussed with Kashif Capps in detail. he appeared to understand and asked that I proceed with plans for surgery.    Upon eye examination, patient was found to have a visually significant cataract right eye. Discussed cataract surgery with patient and different intraocular lens implant options with patient: basic monofocal intraocular lens implant, Toric intraocular lens implant, and presbyopia correction intraocular lens implant. In my medical opinion, based on medical history and ocular examination, cataract surgery with intraocular lens implant will correct patient's vision and improve quality of patient's daily living activities. Patient wishes to have traditional cataract surgery with basic intraocular lens right eye 02/29/2024. Patient wishes to have cataract surgery with the option stated above. Patient understands that an intraocular lens implant does not necessarily replace the need for glasses. Patient understands that it is impossible for the surgeon to inform him/her of every possible complication that may occur. The surgeon has answered all of the patient's questions. Patient understands that if he/she has a mature or dense cataract,  pseudoexfoliation cataract, or history of use of Flomax, he/she may require the use of Maluyugin Ring and/or Vision Blue during surgery. Patient understands the risks, benefits, and alternatives to surgery.    Systane Complete Artificial Tears - Use 1 Drop into both eyes three times a day.  FML 1 drop in the right eye three times daily    2. Status post cataract extraction and insertion of intraocular lens of left eye - ICD9: V45.61, V43.1, ICD10: Z98.42, Z96.1    Patient followed up with Dr. Thibodeaux post cataract surgery 01/11/2024  Continue:  Acular 1 drop in the left eye three times daily until 02/15/2024  Pred Forte 1 drop in the left eye three times daily until 02/15/2024    3. Nonexudative age-related macular degeneration, bilateral, early dry stage - ICD9: 362.51, ICD10: H35.3131    Monitor with Amsler grid    4. 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 Primary hypertension - ICD9: 401.9, ICD10: I10  6. Anxiety - ICD9: 300.00, ICD10: F41.9    Continue care with primary care physician    Dmitri Kimball MD    I have confirmed and edited as necessary the relevant ophthalmic history, review of systems, surgical history, and ophthalmological examination findings as obtained by the ophthalmic technical staff. I have seen and examined Kashif Capps. I have discussed the examination findings, diagnosis, and treatment options with Kashif Capps and/or his family. I have also reviewed and agree with the assessment and plan as stated above and agree with all its relevant components. I gave the patient the opportunity to ask questions about the findings, diagnosis, and treatment options.  Electronically signed by Dmitri Kimball MD at 02/06/2024 10:17 AM EST   Plan of Treatment  - documented as of this encounter  Plan of Treatment - Upcoming Encounters  Upcoming Encounters  Date Type Department Care Team (Latest Contact  Info) Description   03/01/2024 1:30 PM EST Office Visit [facility] Ophthalmology   [address] [id number]   [phone]  Dmitri Kimball MD   [address] [place] [address]   [phone] (Work)   [phone] (Fax)  1 day PO 2nd RE basic     Plan of Treatment - Scheduled Procedures  Scheduled Procedures  Name Priority Associated Diagnoses Date/Time   SURGERY AT [facility] FAC[facility]   Combined form of age-related cataract, right eye  02/29/2024 10:15 AM EST     Procedures  - documented in this encounter  Procedures  Procedure Name Priority Date/Time Associated Diagnosis Comments   VISUAL FIELD 24-2 OU (BOTH EYES) Routine 02/06/2024 10:16 AM EST Quadrantic scotoma of left eye/superior temporal  Results for this procedure are in the results section.    IOL BIOMETRY W/ IOL CALC OD (RIGHT EYE) Routine 02/06/2024 10:15 AM EST Combined form of age-related cataract, right eye  Results for this procedure are in the results section.      Imaging Results  - documented in this encounter  Table of Contents for Imaging Results   VISUAL FIELD 24-2 OU (BOTH EYES) (02/06/2024 10:16 AM EST)   IOL BIOMETRY W/ IOL CALC OD (RIGHT EYE) (02/06/2024 10:15 AM EST)      VISUAL FIELD 24-2 OU (BOTH EYES) (02/06/2024 10:16 AM EST)  Imaging Results - VISUAL FIELD 24-2 OU (BOTH EYES) (02/06/2024 10:16 AM EST)  Anatomical Region Laterality Modality       Other     Imaging Results - VISUAL FIELD 24-2 OU (BOTH EYES) (02/06/2024 10:16 AM EST)  Narrative   02/06/2024 10:16 AM EST   Date of Procedure  2/6/2024.    Reliability  Right Eye  Good.    Left Eye  Good.    Interpretation  Right Eye  Normal.    Left Eye  Quadrantic defect (Superior temporal).    Interval Change  Right Eye  Stable.    Left Eye  Stable.       Imaging Results - VISUAL FIELD 24-2 OU (BOTH EYES) (02/06/2024 10:16 AM EST)  Authorizing Provider Result Type   Dmitri Kimball MD OPHTHALMOLOGY     Associated Images       2/6/2024  VISUAL FIELD 24-2 OU (BOTH EYES)   Back to  top of Imaging Results       IOL BIOMETRY W/ IOL CALC OD (RIGHT EYE) (02/06/2024 10:15 AM EST)  Imaging Results - IOL BIOMETRY W/ IOL CALC OD (RIGHT EYE) (02/06/2024 10:15 AM EST)  Anatomical Region Laterality Modality       Other     Imaging Results - IOL BIOMETRY W/ IOL CALC OD (RIGHT EYE) (02/06/2024 10:15 AM EST)  Narrative   02/06/2024 10:15 AM EST   Date of Procedure  2/6/2024.    Technician Information  Imaging Technician: AB.    Notes  Measurements only - see Procedure Record under Scanned Documents for  signed results.    LENSTAR  Right eye:  AL 23.62 ACD 2.88 WTW 11.95        Imaging Results - IOL BIOMETRY W/ IOL CALC OD (RIGHT EYE) (02/06/2024 10:15 AM EST)  Authorizing Provider Result Type   Dmitri Kimball MD OPHTHALMOLOGY     Associated Images       2/6/2024  IOL BIOMETRY W/ IOL CALC OD (RIGHT EYE)   Back to top of Imaging Results  Visit Diagnoses  - documented in this encounter  Visit Diagnoses  Diagnosis   Combined form of age-related cataract, right eye - Primary    Status post cataract extraction and insertion of intraocular lens of left eye    Nonexudative age-related macular degeneration, bilateral, early dry stage    Quadrantic scotoma of left eye/superior temporal    Primary hypertension   Unspecified essential hypertension    Anxiety   Anxiety state, unspecified      Orders  - documented in this encounter  Orders  Medications Ordered That Might Not Have Been Administered Count Last Ordered Date First Ordered Date   proparacaine 0.5 % 1 Drop (ALCAINE) 1 02/06/2024       Eye Exam    Eye Exam - Visual Acuity (Snellen - Linear)  Visual Acuity (Snellen - Linear)    Right eye Left eye   Dist sc   20/25 +2   Dist cc 20/50     Near cc J6       Eye Exam - Tonometry (Applanation, 10:17 AM)  Tonometry (Applanation, 10:17 AM)    Right eye Left eye   Pressure 14 14     Eye Exam - Pupils  Pupils    Pupils Dark Shape APD   Right eye PERRL 3 Round None   Left eye PERRL 3 Round None     Eye Exam - Visual  Fields  Visual Fields    Right eye Left eye     Full Full     Eye Exam - Extraocular Movement  Extraocular Movement    Right eye Left eye     Full Full     Eye Exam - Neuro/Psych  Neuro/Psych  Oriented x3: Yes   Mood/Affect: Normal     Eye Exam - External Exam  External Exam    Right eye Left eye   External Normal including orbits and preauricular lymph nodes Normal including orbits and preauricular lymph nodes     Eye Exam - Slit Lamp Exam  Slit Lamp Exam    Right eye Left eye   Lids/Lashes Upper lid dermatochalasis Normal lids, lashes, lacrimal glands, and lacrimal drainage   Conjunctiva/Sclera White and quiet White and quiet   Cornea Punctate epithelial keratitis Normal epithelium, stroma, endothelium, and tear film   Anterior Chamber Deep and quiet Deep and quiet   Iris Round and reactive Round and reactive   Lens 3+ Nuclear sclerotic cataract, 3+ Posterior subcapsular cataract Posterior chamber intraocular lens   Anterior Vitreous Normal Normal     Eye Exam - Fundus Exam  Fundus Exam    Right eye Left eye   Disc Normal Normal   C/D Ratio 0.2 0.2   Macula Retinal pigment epithelial mottling, Drusen Retinal pigment epithelial mottling, Drusen   Vessels Normal Normal   Periphery Normal Normal     Eye Exam - Wearing Rx  Wearing Rx    Sphere Cylinder Axis Add   Right eye -2.25 +1.50 007 +2.50   Left eye       +2.50     Eye Exam  Type: PAL     Eye Exam - Manifest Refraction (Auto)  Manifest Refraction (Auto)    Sphere Cylinder Axis   Right eye -2.50 +1.75 001   Left eye -0.25 -0.50 041     Care Teams  - documented as of this encounter  Care Teams  Team Member Relationship Specialty Start Date End Date   Tata Billingsley DO   NPI: [id number]   [address] [facility] [place] [address]   [phone] (Work)   [phone] (Fax)  PCP - General Internal Medicine 12/29/23     Sara Thibodeaux OD   NPI: [id number]   [address] [place] [address]   [phone] (Work)   [phone] (Fax)   Referring Optometry 12/29/23

## 2024-02-29 NOTE — ANESTHESIA PREPROCEDURE EVALUATION
Patient: Kashif Capps    Procedure Information       Date/Time: 01/11/24 1020    Procedure: Phacoemulsification Cataract with Insertion Intraocular Lens (Left: Eye)    Location: [facility] [place] [facility]  / Virtual [facility] [place]    Surgeons: Dmitri Kimball MD            Relevant Problems   Anesthesia (within normal limits)      Cardiovascular   (+) Hypertension, benign   (+) Peripheral vascular disease, unspecified (CMS/HCC)      GI   (+) Hiatal hernia      Neuro/Psych   (+) Anxiety      Musculoskeletal   (+) Osteoarthritis of right hip       Clinical information reviewed:   Tobacco  Allergies  Meds   Med Hx  Surg Hx   Fam Hx          NPO Detail:  No data recorded       Physical Exam    Airway  Mallampati: II  TM distance: >3 FB  Neck ROM: full     Cardiovascular   Rhythm: regular  Rate: normal     Dental   Comments: Missing tooth/ Implant in progress   Pulmonary   Breath sounds clear to auscultation     Abdominal        MAC discussed with Patient.  Plan and process discussed for anesthesia for procedure.  Risks and benefits discussed.  Need for cooperation with the surgeon for possible block per surgeon as well as the procedure. All questions answered with patient.  The patient understands plan and wishes to proceed.      Anesthesia Plan    History of general anesthesia?: yes  History of complications of general anesthesia?: no    ASA 2     MAC     The patient is not a current smoker.    intravenous induction   Anesthetic plan and risks discussed with patient.

## 2024-02-29 NOTE — ANESTHESIA POSTPROCEDURE EVALUATION
Patient: Kashif Capps    Procedure Summary       Date: 02/29/24 Room / Location: [facility] OR 05 / [facility] OR    Anesthesia Start: 1246 Anesthesia Stop: 1306    Procedure: Phacoemulsification Cataract with Insertion Intraocular Lens (Right: Eye) Diagnosis:       Combined forms of age-related cataract of right eye      (Combined forms of age-related cataract of right eye [H25.811])    Surgeons: Dmitri Kimball MD Responsible Provider: Maik Cortez MD    Anesthesia Type: MAC ASA Status: 2            Anesthesia Type: MAC    Vitals Value Taken Time   /84 02/29/24 1308   Temp 36.8 °C (98.2 °F) 02/29/24 1308   Pulse 87 02/29/24 1308   Resp 20 02/29/24 1308   SpO2 99 % 02/29/24 1308       Anesthesia Post Evaluation    Patient location during evaluation: bedside  Patient participation: complete - patient participated  Level of consciousness: sleepy but conscious  Pain management: adequate  Airway patency: patent  Cardiovascular status: acceptable  Respiratory status: acceptable  Hydration status: acceptable  Postoperative Nausea and Vomiting: none    No notable events documented.

## 2024-02-29 NOTE — OP NOTE
Phacoemulsification Cataract with Insertion Intraocular Lens (R) Operative Note     Date: 2024  OR Location: [facility] OR    Name: Kashif Capps, : 1940, Age: 83 y.o., MRN: [id number], Sex: male    Diagnosis  Pre-op Diagnosis     * Combined forms of age-related cataract of right eye [H25.811] Post-op Diagnosis     * Combined forms of age-related cataract of right eye [H25.811]     Procedures  Phacoemulsification Cataract with Insertion Intraocular Lens  32093 - OR XCAPSL CTRC RMVL INSJ IO LENS PROSTH W/O ECP      Surgeons      * Dmitri Kimball - Primary    Resident/Fellow/Other Assistant:  Surgeon(s) and Role:    Procedure Summary  Anesthesia: Monitor Anesthesia Care  ASA: II  Anesthesia Staff: Anesthesiologist: Maik Cortez MD  Estimated Blood Loss: None  Intra-op Medications: Administrations occurring from 1300 to 1340 on 24:  * No intraprocedure medications in log *    Anesthesia Record        Intraprocedure I/O Totals       None      Specimen: No specimens collected     Staff:   Circulator: Zabrina Verduzco RN; Shane Dominique, [place]; Laura Dent RN  Scrub Person: Akira Zavala RN    Drains and/or Catheters: * None in log *    Implants:  Implants       Type Name Action Serial No.      Lens LENS, INTRAOCULAR, SN60WF 17.5 - [id number] - [id number] Implanted [id number]        Findings: Combined Form Age Related Cataract Right Eye     Indications: Kashif Capps is an 83 y.o. male who is having surgery for Combined forms of age-related cataract of right eye [H25.811]. Blurred vision right eye for near and distance.  Difficulty seeing to read and watch TV with right eye.     The patient was seen in the preoperative area. The risks, benefits, complications, treatment options, non-operative alternatives, expected recovery and outcomes were discussed with the patient. The possibilities of reaction to medication, pulmonary aspiration, injury to surrounding structures, bleeding, recurrent  infection, the need for additional procedures, failure to diagnose a condition, and creating a complication requiring transfusion or operation were discussed with the patient. The patient concurred with the proposed plan, giving informed consent.  The site of surgery was properly noted/marked if necessary per policy. The patient has been actively warmed in preoperative area. Preoperative antibiotics are not indicated. Venous thrombosis prophylaxis are not indicated.    Procedure Details: The patient was correctly identified and the patient's operative eye was marked with a marking pen and verified with the patient in the pre-operative area.   The operative eye was dilated in the preoperative area.  The patient was then taken to the operating room where timeout was performed before starting the procedure. Combined anesthesia  with intravenous sedation and topical tetracaine eyedrops were instilled into the right eye. The operative eye  was prepped and draped in the standard sterile ophthalmic fashion in  preparation for ophthalmic surgery.  A Jose wire speculum was then inserted between the eyelids of the right eye and the operating microscope was placed over the right eye.  A paracentesis incision was made approximately 30° away from the planned surgical incision site with the help of MVR blade.  1% lidocaine MPF with Phenylephrine 1.5% PF was injected into the anterior chamber through the paracentesis incision. A near limbal clear corneal incision was fashioned in the temporal quadrant just outside the vascular arcade and Viscoat was injected into anterior chamber to firm the eye. A bent needle cystotome was used and Utrata forceps were utilized to create a continuous curvilinear capsulorrhexis.  BSS was injected beneath the anterior capsule to hydrodissect the nucleus from adjacent cortex and capsule.  The residual cortex was then aspirated with irrigation/aspiration handpiece. The posterior capsule was then  polished with the help of soft irrigation-aspiration tip.  Provisc viscoelastic was then injected into the eye to reform the anterior chamber and to open the capsular bag.  The intraocular lens implant was taken from its sterile wrapping, inspected under the surgical microscope and found to be in good condition. The intraocular lens implant +17.5D was injected into the capsule bag. The Provisc was then aspirated from the anterior chamber and from behind the intraocular lens implant.  The anterior chamber was inflated with the help of BSS to moderate tension.  And the edges of the surgical incision were then hydrated with the help of BSS.  Vigamox was then injected into the anterior chamber and into the capsule bag through the paracentesis incision. The surgical wound was then inspected and found to be watertight.  The wire speculum and drapes were then removed.  Pred Forte eyedrops, Acular eyedrops and Betadine 5% sterile ophthalmic solution were instilled in the conjunctival sac.     The patient tolerated the procedure well and was taken to recovery room in stable condition.    Complications:  None; patient tolerated the procedure well.    Disposition:  Home  Condition: stable     Attending Attestation: I performed the procedure.    Dmitri Kimball  Phone Number: [phone]

## 2024-03-07 ENCOUNTER — OFFICE VISIT (OUTPATIENT)
Dept: UROLOGY | Facility: CLINIC | Age: 84
End: 2024-03-07
Payer: MEDICARE

## 2024-03-07 VITALS — BODY MASS INDEX: 23.52 KG/M2 | WEIGHT: 168 LBS | RESPIRATION RATE: 16 BRPM | HEIGHT: 71 IN

## 2024-03-07 DIAGNOSIS — N39.3 STRESS INCONTINENCE OF URINE: ICD-10-CM

## 2024-03-07 DIAGNOSIS — R35.0 URINARY FREQUENCY: ICD-10-CM

## 2024-03-07 DIAGNOSIS — R35.0 BENIGN PROSTATIC HYPERPLASIA WITH URINARY FREQUENCY: Primary | ICD-10-CM

## 2024-03-07 DIAGNOSIS — R35.1 NOCTURIA: ICD-10-CM

## 2024-03-07 DIAGNOSIS — N40.1 BENIGN PROSTATIC HYPERPLASIA WITH URINARY FREQUENCY: Primary | ICD-10-CM

## 2024-03-07 PROBLEM — N40.0 BPH (BENIGN PROSTATIC HYPERPLASIA): Status: ACTIVE | Noted: 2024-03-07

## 2024-03-07 PROCEDURE — 4004F PT TOBACCO SCREEN RCVD TLK: CPT | Performed by: UROLOGY

## 2024-03-07 PROCEDURE — 1160F RVW MEDS BY RX/DR IN RCRD: CPT | Performed by: UROLOGY

## 2024-03-07 PROCEDURE — 99204 OFFICE O/P NEW MOD 45 MIN: CPT | Performed by: UROLOGY

## 2024-03-07 PROCEDURE — 1126F AMNT PAIN NOTED NONE PRSNT: CPT | Performed by: UROLOGY

## 2024-03-07 PROCEDURE — 1159F MED LIST DOCD IN RCRD: CPT | Performed by: UROLOGY

## 2024-03-07 ASSESSMENT — ENCOUNTER SYMPTOMS
EYES NEGATIVE: 1
ENDOCRINE NEGATIVE: 1
ALLERGIC/IMMUNOLOGIC NEGATIVE: 1
NAUSEA: 0
CHILLS: 0
DIFFICULTY URINATING: 0
COUGH: 0
PSYCHIATRIC NEGATIVE: 1
SHORTNESS OF BREATH: 0
FEVER: 0

## 2024-03-07 NOTE — PROGRESS NOTES
Subjective   Patient ID: Kashif Capps is a 83 y.o. male.    HPI  Patient is here to establish for STEPHEN and frequency. Beer and coffee definitely make Sx worse.He was recently started on Oxybutynin 5mg and finds this helpful. Denies dysuria. Denies hematuria. Nocturia x 1-2. No hx of kidney stones. No recent PSA.  ED is chronic and not an issue      Review of Systems   Constitutional:  Negative for chills and fever.   HENT: Negative.     Eyes: Negative.    Respiratory:  Negative for cough and shortness of breath.    Cardiovascular:  Negative for chest pain and leg swelling.   Gastrointestinal:  Negative for nausea.   Endocrine: Negative.    Genitourinary:  Negative for difficulty urinating.        Negative except for documented in HPI   Allergic/Immunologic: Negative.    Neurological:         Alert & oriented X 3   Hematological:         Denies blood thinners   Psychiatric/Behavioral: Negative.         Objective   Physical Exam  Vitals and nursing note reviewed.   Constitutional:       General: He is not in acute distress.     Appearance: Normal appearance.   Pulmonary:      Effort: Pulmonary effort is normal.   Abdominal:      Tenderness: There is no abdominal tenderness.   Genitourinary:     Comments: Kidneys non palpable bilaterally  Bladder non palpable or tender  Scrotum no mass, No hydrocele  Epididymis- No spermatocele. Non Tender.  Testicles: No mass. WNL  Urethra: No discharge  Penis within normal limits... No lesions. circumcised  Prostate - symmetric, no nodules. Smooth  Seminal Vesicals: No mass.  Sphincter tone: normal  Neurological:      Mental Status: He is alert.         Assessment/Plan   Diagnoses and all orders for this visit:  Benign prostatic hyperplasia with urinary frequency  Stress incontinence of urine  -     Referral to Urology  Urinary frequency  Nocturia      All available PSA values reviewed, Options discussed. Questions answered.  PSA ordered   Diet changes for prostate health discussed  and educational information given. Pros/Cons of prostate health supplements discussed.   Treatment options for LUTS reviewed  Increase Ditropan to TID  Discussed timed voiding. Discussed fluid and caffeine intake  Treatment options for ED reviewed.  Lifestyle change to help prevent UTIs discussed. Encouraged fluid intake.    F/U  cysto and check PVR with PSA

## 2024-03-12 ASSESSMENT — ENCOUNTER SYMPTOMS
NAUSEA: 0
CHILLS: 0
FEVER: 0
COUGH: 0
SHORTNESS OF BREATH: 0
ALLERGIC/IMMUNOLOGIC NEGATIVE: 1
EYES NEGATIVE: 1
DIFFICULTY URINATING: 0
ENDOCRINE NEGATIVE: 1
PSYCHIATRIC NEGATIVE: 1

## 2024-03-12 NOTE — PROGRESS NOTES
Subjective   Patient ID: Kashif Capps is a 83 y.o. male.    HPI        Review of Systems   Constitutional:  Negative for chills and fever.   HENT: Negative.     Eyes: Negative.    Respiratory:  Negative for cough and shortness of breath.    Cardiovascular:  Negative for chest pain and leg swelling.   Gastrointestinal:  Negative for nausea.   Endocrine: Negative.    Genitourinary:  Negative for difficulty urinating.        Negative except for documented in HPI   Allergic/Immunologic: Negative.    Neurological:         Alert & oriented X 3   Hematological:         Denies blood thinners   Psychiatric/Behavioral: Negative.         Objective   Physical Exam    Assessment/Plan   There are no diagnoses linked to this encounter.

## 2024-03-12 NOTE — PROGRESS NOTES
Patient ID: Kashif Capps is a 83 y.o. male.    Procedures  The patient was prepped using a Betadine solution. Lidocaine jelly was instilled into the urethra. The flexible cystoscope was sterilely inserted into the urethra and formal cystoscopy performed in a systematic fashion. . For detailed findings of the procedure, please see Dr. Madrigal remarks below  TRIMETHOPRIM  100MG POBID TIMES 3 DAYS GIVEN      PSA  6.0 (3/13/2024)    NO MASS. NO STONE. MILD TRABECULATION. NO MEDIAN LOBE    PVR<200ML    LUTS IMPROVED WITH DITROPAN TID    PROSTATE MRI ORDERED

## 2024-03-18 ENCOUNTER — APPOINTMENT (OUTPATIENT)
Dept: UROLOGY | Facility: CLINIC | Age: 84
End: 2024-03-18
Payer: MEDICARE

## 2024-03-18 ENCOUNTER — PROCEDURE VISIT (OUTPATIENT)
Dept: UROLOGY | Facility: CLINIC | Age: 84
End: 2024-03-18
Payer: MEDICARE

## 2024-03-18 VITALS
BODY MASS INDEX: 23.24 KG/M2 | RESPIRATION RATE: 16 BRPM | DIASTOLIC BLOOD PRESSURE: 78 MMHG | WEIGHT: 166 LBS | HEIGHT: 71 IN | SYSTOLIC BLOOD PRESSURE: 131 MMHG

## 2024-03-18 DIAGNOSIS — R97.20 ELEVATED PSA: Primary | ICD-10-CM

## 2024-03-18 DIAGNOSIS — R35.0 URINARY FREQUENCY: ICD-10-CM

## 2024-03-18 PROCEDURE — 52000 CYSTOURETHROSCOPY: CPT | Performed by: UROLOGY

## 2024-03-18 RX ORDER — TRIMETHOPRIM 100 MG/1
100 TABLET ORAL 2 TIMES DAILY
Qty: 6 TABLET | Refills: 0 | Status: SHIPPED | OUTPATIENT
Start: 2024-03-18 | End: 2024-03-21

## 2024-03-25 ENCOUNTER — APPOINTMENT (OUTPATIENT)
Dept: UROLOGY | Facility: CLINIC | Age: 84
End: 2024-03-25
Payer: MEDICARE

## 2024-03-28 ENCOUNTER — APPOINTMENT (OUTPATIENT)
Dept: RADIOLOGY | Facility: HOSPITAL | Age: 84
End: 2024-03-28
Payer: MEDICARE

## 2024-04-03 ENCOUNTER — HOSPITAL ENCOUNTER (OUTPATIENT)
Dept: RADIOLOGY | Facility: HOSPITAL | Age: 84
Discharge: HOME | End: 2024-04-03
Payer: MEDICARE

## 2024-04-03 DIAGNOSIS — R97.20 ELEVATED PSA: ICD-10-CM

## 2024-04-03 PROCEDURE — 72197 MRI PELVIS W/O & W/DYE: CPT

## 2024-04-03 PROCEDURE — 2550000001 HC RX 255 CONTRASTS: Performed by: UROLOGY

## 2024-04-03 PROCEDURE — 72197 MRI PELVIS W/O & W/DYE: CPT | Performed by: RADIOLOGY

## 2024-04-03 PROCEDURE — A9575 INJ GADOTERATE MEGLUMI 0.1ML: HCPCS | Performed by: UROLOGY

## 2024-04-03 RX ORDER — GADOTERATE MEGLUMINE 376.9 MG/ML
15 INJECTION INTRAVENOUS
Status: COMPLETED | OUTPATIENT
Start: 2024-04-03 | End: 2024-04-03

## 2024-04-03 RX ADMIN — GADOTERATE MEGLUMINE 15 ML: 376.9 INJECTION INTRAVENOUS at 12:48

## 2024-04-03 ASSESSMENT — ENCOUNTER SYMPTOMS
COUGH: 0
DIFFICULTY URINATING: 0
EYES NEGATIVE: 1
ALLERGIC/IMMUNOLOGIC NEGATIVE: 1
FEVER: 0
ENDOCRINE NEGATIVE: 1
NAUSEA: 0
PSYCHIATRIC NEGATIVE: 1
CHILLS: 0
SHORTNESS OF BREATH: 0

## 2024-04-03 NOTE — PROGRESS NOTES
Subjective   Patient ID: Kashif Capps is a 83 y.o. male.    HPI  Patient is here for prostate MRI results. MRI showed. Most recent PSA was 6.0 on 3/24.  Chronic BPH sx are mild and stable. Denies urgency and frequency. Denies dysuria. Denies hematuria. Nocturia x1. He is taking Oxybutynin 5mg TID and finds this helpful. Normal cysto on 3/24. ED is chronic and not an issue.       Review of Systems   Constitutional:  Negative for chills and fever.   HENT: Negative.     Eyes: Negative.    Respiratory:  Negative for cough and shortness of breath.    Cardiovascular:  Negative for chest pain and leg swelling.   Gastrointestinal:  Negative for nausea.   Endocrine: Negative.    Genitourinary:  Negative for difficulty urinating.        Negative except for documented in HPI   Allergic/Immunologic: Negative.    Neurological:         Alert & oriented X 3   Hematological:         Denies blood thinners   Psychiatric/Behavioral: Negative.         Objective   Physical Exam  Vitals and nursing note reviewed.   Pulmonary:      Effort: Pulmonary effort is normal.      Breath sounds: Normal breath sounds.   Abdominal:      Palpations: Abdomen is soft.      Tenderness: There is no abdominal tenderness.   Genitourinary:     Comments: Kidneys non palpable bilaterally  Bladder non palpable or tender  Neurological:      Mental Status: He is alert.         Assessment/Plan   Diagnoses and all orders for this visit:  Urinary frequency  Elevated PSA  Nocturia      All available PSA values reviewed, Options discussed. Questions answered.  MRI reviewed-I did not see bladder lesion at recent cysto but patient has agreeed to repeat Cysto  Pros and cons of prostate biopsy reviewed. Other options discussed. Questions answered   Diet changes for prostate health discussed and educational information given. Pros/Cons of prostate health supplements discussed.   Treatment options for LUTS reviewed  Continue Ditropan  Discussed timed voiding. Discussed  fluid and caffeine intake  Treatment options for ED reviewed.  Lifestyle change to help prevent UTIs discussed. Encouraged fluid intake.    F/U  cysto 2-3 months

## 2024-04-10 ENCOUNTER — OFFICE VISIT (OUTPATIENT)
Dept: UROLOGY | Facility: CLINIC | Age: 84
End: 2024-04-10
Payer: MEDICARE

## 2024-04-10 VITALS
HEIGHT: 71 IN | BODY MASS INDEX: 22.99 KG/M2 | DIASTOLIC BLOOD PRESSURE: 79 MMHG | HEART RATE: 91 BPM | WEIGHT: 164.2 LBS | SYSTOLIC BLOOD PRESSURE: 148 MMHG

## 2024-04-10 DIAGNOSIS — R35.1 NOCTURIA: ICD-10-CM

## 2024-04-10 DIAGNOSIS — R35.0 URINARY FREQUENCY: ICD-10-CM

## 2024-04-10 DIAGNOSIS — R97.20 ELEVATED PSA: ICD-10-CM

## 2024-04-10 PROCEDURE — 3078F DIAST BP <80 MM HG: CPT | Performed by: UROLOGY

## 2024-04-10 PROCEDURE — 1159F MED LIST DOCD IN RCRD: CPT | Performed by: UROLOGY

## 2024-04-10 PROCEDURE — 3077F SYST BP >= 140 MM HG: CPT | Performed by: UROLOGY

## 2024-04-10 PROCEDURE — 1160F RVW MEDS BY RX/DR IN RCRD: CPT | Performed by: UROLOGY

## 2024-04-10 PROCEDURE — 99214 OFFICE O/P EST MOD 30 MIN: CPT | Performed by: UROLOGY

## 2024-04-10 ASSESSMENT — PATIENT HEALTH QUESTIONNAIRE - PHQ9
SUM OF ALL RESPONSES TO PHQ9 QUESTIONS 1 AND 2: 2
1. LITTLE INTEREST OR PLEASURE IN DOING THINGS: SEVERAL DAYS
2. FEELING DOWN, DEPRESSED OR HOPELESS: SEVERAL DAYS

## 2024-05-08 ENCOUNTER — OFFICE VISIT (OUTPATIENT)
Dept: PRIMARY CARE | Facility: CLINIC | Age: 84
End: 2024-05-08
Payer: MEDICARE

## 2024-05-08 VITALS
WEIGHT: 156 LBS | BODY MASS INDEX: 21.84 KG/M2 | HEART RATE: 75 BPM | DIASTOLIC BLOOD PRESSURE: 73 MMHG | HEIGHT: 71 IN | SYSTOLIC BLOOD PRESSURE: 112 MMHG

## 2024-05-08 DIAGNOSIS — I73.9 PERIPHERAL VASCULAR DISEASE, UNSPECIFIED (CMS-HCC): ICD-10-CM

## 2024-05-08 DIAGNOSIS — R35.0 BENIGN PROSTATIC HYPERPLASIA WITH URINARY FREQUENCY: ICD-10-CM

## 2024-05-08 DIAGNOSIS — F41.9 ANXIETY: ICD-10-CM

## 2024-05-08 DIAGNOSIS — Z00.00 MEDICARE ANNUAL WELLNESS VISIT, SUBSEQUENT: ICD-10-CM

## 2024-05-08 DIAGNOSIS — K21.9 GASTROESOPHAGEAL REFLUX DISEASE WITHOUT ESOPHAGITIS: ICD-10-CM

## 2024-05-08 DIAGNOSIS — N40.1 BENIGN PROSTATIC HYPERPLASIA WITH URINARY FREQUENCY: ICD-10-CM

## 2024-05-08 DIAGNOSIS — I10 HYPERTENSION, BENIGN: Primary | ICD-10-CM

## 2024-05-08 PROCEDURE — 3074F SYST BP LT 130 MM HG: CPT | Performed by: INTERNAL MEDICINE

## 2024-05-08 PROCEDURE — 99214 OFFICE O/P EST MOD 30 MIN: CPT | Performed by: INTERNAL MEDICINE

## 2024-05-08 PROCEDURE — 1159F MED LIST DOCD IN RCRD: CPT | Performed by: INTERNAL MEDICINE

## 2024-05-08 PROCEDURE — 1160F RVW MEDS BY RX/DR IN RCRD: CPT | Performed by: INTERNAL MEDICINE

## 2024-05-08 PROCEDURE — G0439 PPPS, SUBSEQ VISIT: HCPCS | Performed by: INTERNAL MEDICINE

## 2024-05-08 PROCEDURE — 3078F DIAST BP <80 MM HG: CPT | Performed by: INTERNAL MEDICINE

## 2024-05-08 PROCEDURE — 4004F PT TOBACCO SCREEN RCVD TLK: CPT | Performed by: INTERNAL MEDICINE

## 2024-05-08 PROCEDURE — G0444 DEPRESSION SCREEN ANNUAL: HCPCS | Performed by: INTERNAL MEDICINE

## 2024-05-08 RX ORDER — HYDROGEN PEROXIDE 3 %
20 SOLUTION, NON-ORAL MISCELLANEOUS DAILY
Qty: 90 CAPSULE | Refills: 3 | Status: SHIPPED | OUTPATIENT
Start: 2024-05-08

## 2024-05-08 RX ORDER — ALPRAZOLAM 0.5 MG/1
0.5 TABLET ORAL DAILY PRN
Qty: 30 TABLET | Refills: 2 | Status: SHIPPED | OUTPATIENT
Start: 2024-05-08

## 2024-05-08 ASSESSMENT — PATIENT HEALTH QUESTIONNAIRE - PHQ9
2. FEELING DOWN, DEPRESSED OR HOPELESS: SEVERAL DAYS
1. LITTLE INTEREST OR PLEASURE IN DOING THINGS: SEVERAL DAYS
SUM OF ALL RESPONSES TO PHQ9 QUESTIONS 1 AND 2: 2

## 2024-05-08 NOTE — PROGRESS NOTES
Chief Complaint: Medicare Wellness Exam/Comprehensive Problem Focused Follow Up and Physical Exam    HPI:  Patient is here today for [facility].    Pt did see Dr Madrigal recently had had prostate checked for elevated psa.   Increased ditropan to TID.  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   He is scheduled for repeat cystoscopy, he is not sure that if he had bladder cancer that he would want treatment.         Active Problem List  Patient Active Problem List   Diagnosis    Anxiety    Hiatal hernia    Hypertension, benign    Back pain    Osteoarthritis of right hip    Peripheral vascular disease, unspecified (CMS-HCC)    Combined forms of age-related cataract of right eye    BPH (benign prostatic hyperplasia)    Nocturia    Stress incontinence of urine       Comprehensive Medical/Surgical/Social/Family History  Past Medical History:   Diagnosis Date    Anxiety disorder, unspecified 12/06/2022    Anxiety    Essential (primary) hypertension 12/06/2022    HTN (hypertension)    GERD (gastroesophageal reflux disease)     Personal history of other diseases of the digestive system 06/08/2020    History of right inguinal hernia    Personal history of other specified conditions     History of dyspnea    Personal history of other specified conditions     History of dizziness    Personal history of pneumonia (recurrent)     History of pneumonia    Unilateral primary osteoarthritis, right hip 01/15/2020    Osteoarthritis of right hip     Past Surgical History:   Procedure Laterality Date    OTHER SURGICAL HISTORY  01/09/2020    Colonoscopy    OTHER SURGICAL HISTORY  01/09/2020    Vasectomy    OTHER SURGICAL HISTORY  02/17/2022    Inguinal hernia repair laparoscopic      Social History     Tobacco Use    Smoking status: Every Day     Current packs/day: 1.50     Average packs/day: 1.5 packs/day for 68.3 years (102.5 ttl pk-yrs)     Types: Cigarettes     Start date: 1955    Smokeless tobacco: Never   Vaping Use    Vaping status: Never Used   Substance Use Topics    Alcohol use: Yes     Alcohol/week: 24.0 standard drinks of alcohol     Types: 24 Cans of beer per week    Drug use: Never     No family history on file.      Allergies and Medications  Levofloxacin  Current Outpatient Medications on File Prior to Visit   Medication Sig Dispense Refill    ALPRAZolam (Xanax) 0.5 mg tablet Take 1 tablet (0.5 mg) by mouth once daily as needed for anxiety. 90 tablet 0    amLODIPine-benazepriL (Lotrel) 10-20 mg capsule Take 1 capsule by mouth once daily. 90 capsule 3    fluorometholone (FML) 0.1 % ophthalmic suspension Administer 1 drop into affected eye(s) 3 times a day.      ketorolac (Acular) 0.5 % ophthalmic solution Administer 1 drop into affected eye(s) 4 times a day.      meloxicam (Mobic) 15 mg tablet Take 1 tablet (15 mg) by mouth once daily as needed.      oxybutynin (Ditropan) 5 mg tablet Take 1 tablet (5 mg) by mouth 2 times a day. 60 tablet 5    prednisoLONE acetate (Pred-Forte) 1 % ophthalmic suspension Administer 1 drop into affected eye(s) 4 times a day.      vit C/E/Zn/coppr/lutein/zeaxan (PRESERVISION AREDS-2 ORAL) Take 1 tablet by mouth 2 times a day.      [DISCONTINUED] esomeprazole (NexIUM) 20 mg DR capsule Take 1 capsule (20 mg) by mouth once daily. 90 capsule 3     No current facility-administered medications on file prior to visit.       Medicare Wellness Questionnaire        How have you been on Medicare less than a year ? No  Have you had a Medicare Wellness exam before ? Yes  Have you had any surgeries in the last year ? Yes  Have you developed any new diseases in the last year ? Yes  Have any close family members developed new diseases in the last year ? No  Have  you been to a the hospital in the last year ? Yes  Do you take any pills or supplements other than those prescribed for you ? Yes  Do you take any opiates for pain such as Tramadol, Percocet or Norco ? No  How do you consider your overall health ?Good  Have you ever used tobacco products ? Yes   Have you smoked more than 100 cigarettes in your life ?  Yes  What form of tobacco have you used ? Cigarettes  How many packs per day ? 1-1.5 ppd  How many years ? 60 plus years   Have you quit ? No  Would you like to quit ? No  Do you drink alcohol ?  Yes   What is the most you drink in one day ? 3-4 beers a day or shot of vodka daily   How many drinks usually in 1 Week ? 14-21   Do you or others tell you that your drinking is a problem ? No  Would you like to help to cut down or quit ? No  Have you ever used illegal drugs at anytime in your life including Marijuana ? No   Which of the following describes your diet ?Well balanced  How many days per week on average do you exercise ? 0 days  Do you have any loss of hearing ? No  Do you have hearing aids ? No  Have you or others noted you have loss of memory ? No  Do you need someone to assist you with any of the following ? None   Do you need someone to assist you with any of the following ? None   Have you fallen in the last 6 months ? No  Do you have any of the following in your house ?  None   Do you have a living will ? Yes  Do you have a durable power of  for health care decisions ? Yes    Medications and Supplements  prescribed by me and other practitioners or clinical pharmacist (such as prescriptions, OTC's, herbal therapies and supplements) were reviewed and documented in the medical record.    Tobacco/Alcohol/Opioid use, as well as Illicit Drug Use was screened for/reviewed and documented in Social History section and medication list as appropriate  Activities of Daily Living  In your present state of health, do you have any difficulty performing the  "following activities?:   Preparing food and eating?: No  Bathing yourself: No  Getting dressed: No  Using the toilet:No  Moving around from place to place: No  In the past year have you fallen or had a near fall?:No    Depression Screen  (Note: if answer to either of the following is \"Yes\", then a more complete depression screening is indicated)   Q1: Over the past two weeks, have you felt down, depressed or hopeless?   No   Q2: Over the past two weeks, have you felt little interest or pleasure in doing things?   no    Depression screening was completed and negative, discussed for 5 min, has some depression symptoms over significant others worsening dementia and stress that the adds to his life.     Current exercise habits: The patient does not participate in regular exercise at present.   Dietary issues discussed: Yes  Hearing difficulties: No  Safe in current home environment: yes  Visual Acuity assessed: no  Cognitive Impairment assessed: yes       Advance directives  Advanced Care Planning (including a Living Will, Healthcare POA, as well as specific end of life choices and/or directives), was discussed for approximately 5  minutes with the patient and/or surrogate, voluntarily, and documented in the medical record.     Cardiac Risk Assessment  Cardiovascular risk was discussed and, if needed, lifestyle modifications recommended, including nutritional choices, exercise, and elimination of habits contributing to risk. We agreed on a plan to reduce the current cardiovascular risk based on above discussion as needed.  Aspirin use/disuse was discussed after reviewing the updated guidelines below:    Consider low dose Aspirin ( mg) use if the benefit for cardiovascular disease prevention outweighs risk for bleeding complications.   In general, low dose ASA should be considered:  In patients WITHOUT prior MI/stroke/PAD (primary prevention):   a. Age <60: Use if 10-year cardiovascular disease risk >20%, with " "discussion of risks and benefits with patient  b. Age 60-<70: Use if 10-year cardiovascular disease risk >20% and low bleeding (e.g., gastrointenstinal) risk, with discussion of risks and benefits with patient  c. Age >=70: Do not use    In patients WITH prior MI/stroke/PAD (secondary prevention):   Generally use unless extremely high bleeding (e.g., gastrointenstinal) risk, with discussion of risks and benefits with patient    ROS otherwise negative aside from what was mentioned above in HPI.    Vitals  /73   Pulse 75   Ht 1.803 m (5' 11\")   Wt 70.8 kg (156 lb)   BMI 21.76 kg/m²   Body mass index is 21.76 kg/m².  Physical Exam  Gen: Alert, NAD  HEENT:  PERRLA, EOMI, conjunctiva and sclera normal in appearance.   Neck:  Supple with FROM; No masses/nodes palpable; Thyroid nontender and without nodules; No PIERRE  Respiratory:  Lungs CTAB  Cardiovascular:  Heart RRR. No M/R/G. Peripheral pulses equal bilaterally  Abdomen:  Soft, nontender, BS present throughout; No R/G/R; No HSM or masses palpated  Extremities:  FROM all extremities; Muscle strength grossly normal with good tone  Neuro:  CN II-XII intact; Reflexes 2+/2+; Gross motor and sensory intact  Skin:  No suspicious lesions present    OARRS:  No data recorded  I have personally reviewed the OARRS report for Kashif Capps. I have considered the risks of abuse, dependence, addiction and diversion and I believe that it is clinically appropriate for Kashif Capps to be prescribed this medication     Is the patient prescribed a combination of a benzodiazepine and opioid?  No     Last Urine Drug Screen / ordered today: Yes  Recent Results         Component  Ref Range & Units 3 mo ago   Clonazepam  <25 ng/mL <25   7-Aminoclonazepam  <25 ng/mL <25   Alprazolam  <25 ng/mL 44 High    Comment: Consistent with use of a drug containing alprazolam, such as Xanax.   Alpha-Hydroxyalprazolam  <25 ng/mL 79 High    Comment: Alprazolam metabolite; consistent with use of a " drug containing alprazolam, such as Xanax.   Midazolam  <25 ng/mL <25   Alpha-Hydroxymidazolam  <25 ng/mL <25   Chlordiazepoxide  <25 ng/mL <25   Diazepam  <25 ng/mL <25   Nordiazepam  <25 ng/mL <25   Temazepam  <25 ng/mL <25   Oxazepam  <25 ng/mL <25   Lorazepam  <25 ng/mL <25   Methadone  <25 ng/mL <25   EDDP  <25 ng/mL <25   6-Acetylmorphine  <25 ng/mL <25   Codeine  <50 ng/mL <50   Hydrocodone  <25 ng/mL <25   Hydromorphone  <25 ng/mL <25   Morphine  <50 ng/mL <50   Norhydrocodone  <25 ng/mL <25   Noroxycodone  <25 ng/mL <25   Oxycodone  <25 ng/mL <25   Oxymorphone  <25 ng/mL <25   Fentanyl  <2.5 ng/mL <2.5   Norfentanyl  <2.5 ng/mL <2.5   Tramadol  <50 ng/mL <50   O-Desmethyltramadol  <50 ng/mL <50   Zolpidem  <25 ng/mL <25   Zolpidem Metabolite (ZCA)  <25 ng/mL <25   Resulting Agency [facility]                Narrative  Performed by: [facility]  The performance characteristics of this test has  been validated by the individual  laboratory site where  testing is performed. It has not been cleared or approved  by the FDA. However the FDA has determined that such clearance  or approval is not necessary. Our Laboratory is certified under  the Clinical Laboratory Improvement Amendments of 1988 (CLIA)  as qualified to perform high complexity clinical laboratory testing.                  Controlled Substance Agreement:  Date of the Last Agreement: 11/08/2023  Reviewed Controlled Substance Agreement including but not limited to the benefits, risks, and alternatives to treatment with a Controlled Substance medication(s).     Benzodiazepines:  What is the patient's goal of therapy? Improvement of anxiety   Is this being achieved with current treatment? yes     JEANNETTE-7:  No data recorded     Activities of Daily Living:   Is your overall impression that this patient is benefiting (symptom reduction outweighs side effects) from benzodiazepine therapy? Yes      1. Physical Functioning: Better  2. Family Relationship: Better  3.  Social Relationship: Better  4. Mood: Better  5. Sleep Patterns: Better  6. Overall Function: Better        Assessment and Plan:  Problem List Items Addressed This Visit       Anxiety    Hypertension, benign - Primary    Peripheral vascular disease, unspecified (CMS-HCC)    BPH (benign prostatic hyperplasia)     Other Visit Diagnoses       Gastroesophageal reflux disease without esophagitis        Relevant Medications    esomeprazole (NexIUM) 20 mg DR capsule            1. Anxiety   - uses benzo prn  - CSA and UDS up to date as expected   - I have personally reviewed this patient's OARRS report and found it to be appropriate. The report has been uploaded into the medical record. I have considered the risks of abuse, addiction, dependence, and diversion and feel that it is clinically appropriate for this patient to be prescribed this controlled medication.     2. HTN , controlled  - continue norvasc-benazapril 10-20 1 tab po daily       3. GERD   - continue Nexium 20mg po daily     4.  Urinary incontinence, elevated psa   -did see Dr Madrigal   - had cystoscope which was normal   - MRI prostate was negative for malignancy but possible area of malignancy in left posterolateral bladder wall near the left UV junction representing a urothelial neoplasm without underlying muscle invasion   = pt scheduled for another cystoscope but he is not sure that he would want to proceed with any treatment if he had bladder cancer; he is going to think about it        During the course of the visit the patient was educated and counseled about age appropriate screening and preventive services. Completed preventive screenings were documented in the chart and orders were placed for outstanding screenings/procedures as documented in the Assessment and Plan.      Patient Instructions (the written plan) was given to the patient at check out.      Tata Billingsley DO

## 2024-07-10 ENCOUNTER — APPOINTMENT (OUTPATIENT)
Dept: UROLOGY | Facility: CLINIC | Age: 84
End: 2024-07-10
Payer: MEDICARE

## 2024-08-08 ENCOUNTER — APPOINTMENT (OUTPATIENT)
Dept: PRIMARY CARE | Facility: CLINIC | Age: 84
End: 2024-08-08
Payer: MEDICARE

## 2024-08-08 VITALS
DIASTOLIC BLOOD PRESSURE: 71 MMHG | HEART RATE: 99 BPM | BODY MASS INDEX: 21.56 KG/M2 | SYSTOLIC BLOOD PRESSURE: 125 MMHG | WEIGHT: 154 LBS | HEIGHT: 71 IN

## 2024-08-08 DIAGNOSIS — R35.0 BENIGN PROSTATIC HYPERPLASIA WITH URINARY FREQUENCY: ICD-10-CM

## 2024-08-08 DIAGNOSIS — K44.9 HIATAL HERNIA: ICD-10-CM

## 2024-08-08 DIAGNOSIS — K21.9 GASTROESOPHAGEAL REFLUX DISEASE WITHOUT ESOPHAGITIS: ICD-10-CM

## 2024-08-08 DIAGNOSIS — F41.9 ANXIETY: ICD-10-CM

## 2024-08-08 DIAGNOSIS — I10 HYPERTENSION, BENIGN: ICD-10-CM

## 2024-08-08 DIAGNOSIS — N40.1 BENIGN PROSTATIC HYPERPLASIA WITH URINARY FREQUENCY: ICD-10-CM

## 2024-08-08 DIAGNOSIS — I10 PRIMARY HYPERTENSION: ICD-10-CM

## 2024-08-08 DIAGNOSIS — I73.9 PERIPHERAL VASCULAR DISEASE, UNSPECIFIED (CMS-HCC): Primary | ICD-10-CM

## 2024-08-08 PROCEDURE — 1159F MED LIST DOCD IN RCRD: CPT | Performed by: INTERNAL MEDICINE

## 2024-08-08 PROCEDURE — 1160F RVW MEDS BY RX/DR IN RCRD: CPT | Performed by: INTERNAL MEDICINE

## 2024-08-08 PROCEDURE — 99214 OFFICE O/P EST MOD 30 MIN: CPT | Performed by: INTERNAL MEDICINE

## 2024-08-08 PROCEDURE — 3074F SYST BP LT 130 MM HG: CPT | Performed by: INTERNAL MEDICINE

## 2024-08-08 PROCEDURE — 4004F PT TOBACCO SCREEN RCVD TLK: CPT | Performed by: INTERNAL MEDICINE

## 2024-08-08 PROCEDURE — 3078F DIAST BP <80 MM HG: CPT | Performed by: INTERNAL MEDICINE

## 2024-08-08 RX ORDER — AMLODIPINE AND BENAZEPRIL HYDROCHLORIDE 10; 20 MG/1; MG/1
1 CAPSULE ORAL DAILY
Qty: 90 CAPSULE | Refills: 3 | Status: SHIPPED | OUTPATIENT
Start: 2024-08-08

## 2024-08-08 RX ORDER — HYDROGEN PEROXIDE 3 %
20 SOLUTION, NON-ORAL MISCELLANEOUS DAILY
Qty: 90 CAPSULE | Refills: 3 | Status: SHIPPED | OUTPATIENT
Start: 2024-08-08

## 2024-08-08 RX ORDER — ALPRAZOLAM 0.5 MG/1
0.5 TABLET ORAL DAILY PRN
Qty: 30 TABLET | Refills: 2 | Status: SHIPPED | OUTPATIENT
Start: 2024-08-08

## 2024-08-08 ASSESSMENT — PATIENT HEALTH QUESTIONNAIRE - PHQ9
1. LITTLE INTEREST OR PLEASURE IN DOING THINGS: NOT AT ALL
2. FEELING DOWN, DEPRESSED OR HOPELESS: NOT AT ALL
SUM OF ALL RESPONSES TO PHQ9 QUESTIONS 1 AND 2: 0

## 2024-08-08 ASSESSMENT — ENCOUNTER SYMPTOMS
BACK PAIN: 1
LEG PAIN: 1

## 2024-08-08 NOTE — PROGRESS NOTES
"Subjective   Patient ID: Kashif Capps is a 83 y.o. male who presents for Follow-up (3 month), Leg Pain (Worsening over the last week), and Back Pain (Worsening over the last week ).  Leg Pain     Back Pain  Associated symptoms include leg pain.     Patient is here today for 3 mo follow up     Pt reports worsening back pain.   Pt denies any falls or injury. No numbness or tingling down his legs.     Review of Systems   Musculoskeletal:  Positive for back pain.       Objective   /71   Pulse 99   Ht 1.803 m (5' 11\")   Wt 69.9 kg (154 lb)   BMI 21.48 kg/m²     Physical Exam  Constitutional:       General: He is not in acute distress.     Appearance: Normal appearance.   HENT:      Head: Normocephalic.      Nose: Nose normal.      Mouth/Throat:      Pharynx: No oropharyngeal exudate.   Eyes:      General:         Right eye: No discharge.         Left eye: No discharge.      Extraocular Movements: Extraocular movements intact.      Pupils: Pupils are equal, round, and reactive to light.   Cardiovascular:      Rate and Rhythm: Normal rate and regular rhythm.      Heart sounds: No murmur heard.     No gallop.   Pulmonary:      Effort: Pulmonary effort is normal. No respiratory distress.      Breath sounds: Normal breath sounds. No wheezing.   Musculoskeletal:         General: No swelling. Normal range of motion.      Comments: +tenderness over lumbar paraspinals    Skin:     General: Skin is warm and dry.      Coloration: Skin is not jaundiced.   Neurological:      General: No focal deficit present.      Mental Status: He is alert and oriented to person, place, and time.      Cranial Nerves: No cranial nerve deficit.   Psychiatric:         Mood and Affect: Mood normal.         Behavior: Behavior normal.         OARRS:  No data recorded  I have personally reviewed the OARRS report for Kashif Capps. I have considered the risks of abuse, dependence, addiction and diversion and I believe that it is clinically " appropriate for Kashif Capps to be prescribed this medication     Is the patient prescribed a combination of a benzodiazepine and opioid?  No     Last Urine Drug Screen / ordered today: Yes  Recent Results         Component  Ref Range & Units 3 mo ago   Clonazepam  <25 ng/mL <25   7-Aminoclonazepam  <25 ng/mL <25   Alprazolam  <25 ng/mL 44 High    Comment: Consistent with use of a drug containing alprazolam, such as Xanax.   Alpha-Hydroxyalprazolam  <25 ng/mL 79 High    Comment: Alprazolam metabolite; consistent with use of a drug containing alprazolam, such as Xanax.   Midazolam  <25 ng/mL <25   Alpha-Hydroxymidazolam  <25 ng/mL <25   Chlordiazepoxide  <25 ng/mL <25   Diazepam  <25 ng/mL <25   Nordiazepam  <25 ng/mL <25   Temazepam  <25 ng/mL <25   Oxazepam  <25 ng/mL <25   Lorazepam  <25 ng/mL <25   Methadone  <25 ng/mL <25   EDDP  <25 ng/mL <25   6-Acetylmorphine  <25 ng/mL <25   Codeine  <50 ng/mL <50   Hydrocodone  <25 ng/mL <25   Hydromorphone  <25 ng/mL <25   Morphine  <50 ng/mL <50   Norhydrocodone  <25 ng/mL <25   Noroxycodone  <25 ng/mL <25   Oxycodone  <25 ng/mL <25   Oxymorphone  <25 ng/mL <25   Fentanyl  <2.5 ng/mL <2.5   Norfentanyl  <2.5 ng/mL <2.5   Tramadol  <50 ng/mL <50   O-Desmethyltramadol  <50 ng/mL <50   Zolpidem  <25 ng/mL <25   Zolpidem Metabolite (ZCA)  <25 ng/mL <25   Resulting Agency [facility]                Narrative  Performed by: [facility]  The performance characteristics of this test has  been validated by the individual  laboratory site where  testing is performed. It has not been cleared or approved  by the FDA. However the FDA has determined that such clearance  or approval is not necessary. Our Laboratory is certified under  the Clinical Laboratory Improvement Amendments of 1988 (CLIA)  as qualified to perform high complexity clinical laboratory testing.                  Controlled Substance Agreement:  Date of the Last Agreement: 11/08/2023  Reviewed Controlled Substance Agreement  including but not limited to the benefits, risks, and alternatives to treatment with a Controlled Substance medication(s).     Benzodiazepines:  What is the patient's goal of therapy? Improvement of anxiety   Is this being achieved with current treatment? yes     JEANNETTE-7:  No data recorded     Activities of Daily Living:   Is your overall impression that this patient is benefiting (symptom reduction outweighs side effects) from benzodiazepine therapy? Yes      1. Physical Functioning: Better  2. Family Relationship: Better  3. Social Relationship: Better  4. Mood: Better  5. Sleep Patterns: Better  6. Overall Function: Better    Assessment/Plan   Problem List Items Addressed This Visit       Anxiety    Hiatal hernia    Hypertension, benign    Peripheral vascular disease, unspecified (CMS-HCC) - Primary    BPH (benign prostatic hyperplasia)     Other Visit Diagnoses       Gastroesophageal reflux disease without esophagitis        Primary hypertension              1. Anxiety   - uses benzo prn  - CSA and UDS up to date as expected   - I have personally reviewed this patient's OARRS report and found it to be appropriate. The report has been uploaded into the medical record. I have considered the risks of abuse, addiction, dependence, and diversion and feel that it is clinically appropriate for this patient to be prescribed this controlled medication.     2. HTN , controlled  - continue norvasc-benazapril 10-20 1 tab po daily       3. GERD   - continue Nexium 20mg po daily     4.  Urinary incontinence, elevated psa   -did see Dr Madrigal   - had cystoscope which was normal   - MRI prostate was negative for malignancy but possible area of malignancy in left posterolateral bladder wall near the left UV junction representing a urothelial neoplasm without underlying muscle invasion   - pt did follow through with another cystoscope, states that if he has bladder cancer he would not want to do any treatment  raven's     5. Back  pain   - denies injury   - taking aleve otc   - declines PT, pain management referral   Final diagnoses:   [K21.9] Gastroesophageal reflux disease without esophagitis   [I10] Primary hypertension   [F41.9] Anxiety   [I73.9] Peripheral vascular disease, unspecified (CMS-HCC)   [I10] Hypertension, benign   [K44.9] Hiatal hernia   [N40.1, R35.0] Benign prostatic hyperplasia with urinary frequency

## 2024-08-16 ENCOUNTER — TELEPHONE (OUTPATIENT)
Dept: PRIMARY CARE | Facility: CLINIC | Age: 84
End: 2024-08-16
Payer: MEDICARE

## 2024-08-16 DIAGNOSIS — J30.1 SEASONAL ALLERGIC RHINITIS DUE TO POLLEN: Primary | ICD-10-CM

## 2024-08-16 NOTE — TELEPHONE ENCOUNTER
Pt stated that at his last appointment he was supposed to get a nasal spray to help dry up his sinuses but he has not received anything. Asking for a call back about this.   Never

## 2024-08-19 RX ORDER — FLUTICASONE PROPIONATE 50 MCG
1 SPRAY, SUSPENSION (ML) NASAL DAILY
Qty: 16 G | Refills: 11 | Status: SHIPPED | OUTPATIENT
Start: 2024-08-19 | End: 2025-08-19

## 2024-11-12 ENCOUNTER — APPOINTMENT (OUTPATIENT)
Dept: PRIMARY CARE | Facility: CLINIC | Age: 84
End: 2024-11-12
Payer: MEDICARE

## 2024-11-12 VITALS
WEIGHT: 153 LBS | HEART RATE: 109 BPM | DIASTOLIC BLOOD PRESSURE: 80 MMHG | HEIGHT: 71 IN | BODY MASS INDEX: 21.42 KG/M2 | SYSTOLIC BLOOD PRESSURE: 126 MMHG

## 2024-11-12 DIAGNOSIS — K21.9 GASTROESOPHAGEAL REFLUX DISEASE WITHOUT ESOPHAGITIS: ICD-10-CM

## 2024-11-12 DIAGNOSIS — I10 PRIMARY HYPERTENSION: ICD-10-CM

## 2024-11-12 DIAGNOSIS — Z79.899 CONTROLLED SUBSTANCE AGREEMENT SIGNED: Primary | ICD-10-CM

## 2024-11-12 DIAGNOSIS — F41.9 ANXIETY: ICD-10-CM

## 2024-11-12 DIAGNOSIS — J30.1 SEASONAL ALLERGIC RHINITIS DUE TO POLLEN: ICD-10-CM

## 2024-11-12 PROCEDURE — 3079F DIAST BP 80-89 MM HG: CPT | Performed by: INTERNAL MEDICINE

## 2024-11-12 PROCEDURE — 80346 BENZODIAZEPINES1-12: CPT

## 2024-11-12 PROCEDURE — 80373 DRUG SCREENING TRAMADOL: CPT

## 2024-11-12 PROCEDURE — 80365 DRUG SCREENING OXYCODONE: CPT

## 2024-11-12 PROCEDURE — 4004F PT TOBACCO SCREEN RCVD TLK: CPT | Performed by: INTERNAL MEDICINE

## 2024-11-12 PROCEDURE — 80368 SEDATIVE HYPNOTICS: CPT

## 2024-11-12 PROCEDURE — 99214 OFFICE O/P EST MOD 30 MIN: CPT | Performed by: INTERNAL MEDICINE

## 2024-11-12 PROCEDURE — 80354 DRUG SCREENING FENTANYL: CPT

## 2024-11-12 PROCEDURE — 82570 ASSAY OF URINE CREATININE: CPT

## 2024-11-12 PROCEDURE — 80307 DRUG TEST PRSMV CHEM ANLYZR: CPT

## 2024-11-12 PROCEDURE — 80358 DRUG SCREENING METHADONE: CPT

## 2024-11-12 PROCEDURE — 1160F RVW MEDS BY RX/DR IN RCRD: CPT | Performed by: INTERNAL MEDICINE

## 2024-11-12 PROCEDURE — 3074F SYST BP LT 130 MM HG: CPT | Performed by: INTERNAL MEDICINE

## 2024-11-12 PROCEDURE — 1159F MED LIST DOCD IN RCRD: CPT | Performed by: INTERNAL MEDICINE

## 2024-11-12 PROCEDURE — 80361 OPIATES 1 OR MORE: CPT

## 2024-11-12 RX ORDER — HYDROGEN PEROXIDE 3 %
20 SOLUTION, NON-ORAL MISCELLANEOUS DAILY
Qty: 90 CAPSULE | Refills: 3 | Status: SHIPPED | OUTPATIENT
Start: 2024-11-12

## 2024-11-12 RX ORDER — AZITHROMYCIN 500 MG/1
500 TABLET, FILM COATED ORAL DAILY
Qty: 5 TABLET | Refills: 0 | Status: SHIPPED | OUTPATIENT
Start: 2024-11-12 | End: 2024-11-17

## 2024-11-12 RX ORDER — ALPRAZOLAM 0.5 MG/1
0.5 TABLET ORAL DAILY PRN
Qty: 30 TABLET | Refills: 2 | Status: SHIPPED | OUTPATIENT
Start: 2024-11-12

## 2024-11-12 RX ORDER — FLUTICASONE PROPIONATE 50 MCG
1 SPRAY, SUSPENSION (ML) NASAL DAILY
Qty: 48 G | Refills: 3 | Status: SHIPPED | OUTPATIENT
Start: 2024-11-12 | End: 2025-11-12

## 2024-11-12 RX ORDER — AMLODIPINE AND BENAZEPRIL HYDROCHLORIDE 10; 20 MG/1; MG/1
1 CAPSULE ORAL DAILY
Qty: 90 CAPSULE | Refills: 3 | Status: SHIPPED | OUTPATIENT
Start: 2024-11-12

## 2024-11-12 ASSESSMENT — ENCOUNTER SYMPTOMS
COUGH: 1
SHORTNESS OF BREATH: 0
SINUS PAIN: 1
SINUS PRESSURE: 1

## 2024-11-12 NOTE — PROGRESS NOTES
"Subjective   Patient ID: Kashif Capps is a 84 y.o. male who presents for Follow-up (3 month).  HPI  Patient is here today for 3 mo follow up     Pt reports that he is having some sinus congestion and drainage for the last week.   Review of Systems   HENT:  Positive for congestion, sinus pressure and sinus pain.    Respiratory:  Positive for cough. Negative for shortness of breath.        Objective   /80   Pulse 109   Ht 1.803 m (5' 11\")   Wt 69.4 kg (153 lb)   BMI 21.34 kg/m²     Physical Exam  Constitutional:       General: He is not in acute distress.     Appearance: Normal appearance.   HENT:      Head: Normocephalic.      Right Ear: Tympanic membrane, ear canal and external ear normal.      Left Ear: Tympanic membrane, ear canal and external ear normal.      Nose: Nose normal.      Mouth/Throat:      Mouth: Mucous membranes are dry.      Pharynx: Oropharyngeal exudate and posterior oropharyngeal erythema present.   Eyes:      General:         Right eye: No discharge.         Left eye: No discharge.      Extraocular Movements: Extraocular movements intact.      Pupils: Pupils are equal, round, and reactive to light.   Cardiovascular:      Rate and Rhythm: Normal rate and regular rhythm.      Heart sounds: No murmur heard.     No gallop.   Pulmonary:      Effort: Pulmonary effort is normal. No respiratory distress.      Breath sounds: Normal breath sounds. No wheezing.   Musculoskeletal:         General: No swelling. Normal range of motion.   Skin:     General: Skin is warm and dry.      Coloration: Skin is not jaundiced.   Neurological:      General: No focal deficit present.      Mental Status: He is alert and oriented to person, place, and time.      Cranial Nerves: No cranial nerve deficit.   Psychiatric:         Mood and Affect: Mood normal.         Behavior: Behavior normal.         OARRS:  No data recorded  I have personally reviewed the OARRS report for Kashif Capps. I have considered the risks " of abuse, dependence, addiction and diversion and I believe that it is clinically appropriate for Kashif Capps to be prescribed this medication     Is the patient prescribed a combination of a benzodiazepine and opioid?  No     Last Urine Drug Screen / ordered today: Yes  Recent Results         Component  Ref Range & Units 3 mo ago   Clonazepam  <25 ng/mL <25   7-Aminoclonazepam  <25 ng/mL <25   Alprazolam  <25 ng/mL 44 High    Comment: Consistent with use of a drug containing alprazolam, such as Xanax.   Alpha-Hydroxyalprazolam  <25 ng/mL 79 High    Comment: Alprazolam metabolite; consistent with use of a drug containing alprazolam, such as Xanax.   Midazolam  <25 ng/mL <25   Alpha-Hydroxymidazolam  <25 ng/mL <25   Chlordiazepoxide  <25 ng/mL <25   Diazepam  <25 ng/mL <25   Nordiazepam  <25 ng/mL <25   Temazepam  <25 ng/mL <25   Oxazepam  <25 ng/mL <25   Lorazepam  <25 ng/mL <25   Methadone  <25 ng/mL <25   EDDP  <25 ng/mL <25   6-Acetylmorphine  <25 ng/mL <25   Codeine  <50 ng/mL <50   Hydrocodone  <25 ng/mL <25   Hydromorphone  <25 ng/mL <25   Morphine  <50 ng/mL <50   Norhydrocodone  <25 ng/mL <25   Noroxycodone  <25 ng/mL <25   Oxycodone  <25 ng/mL <25   Oxymorphone  <25 ng/mL <25   Fentanyl  <2.5 ng/mL <2.5   Norfentanyl  <2.5 ng/mL <2.5   Tramadol  <50 ng/mL <50   O-Desmethyltramadol  <50 ng/mL <50   Zolpidem  <25 ng/mL <25   Zolpidem Metabolite (ZCA)  <25 ng/mL <25   Resulting Agency [facility]                Narrative  Performed by: [facility]  The performance characteristics of this test has  been validated by the individual  laboratory site where  testing is performed. It has not been cleared or approved  by the FDA. However the FDA has determined that such clearance  or approval is not necessary. Our Laboratory is certified under  the Clinical Laboratory Improvement Amendments of 1988 (CLIA)  as qualified to perform high complexity clinical laboratory testing.                  Controlled Substance  Agreement:  Date of the Last Agreement: 11/08/2023  Reviewed Controlled Substance Agreement including but not limited to the benefits, risks, and alternatives to treatment with a Controlled Substance medication(s).     Benzodiazepines:  What is the patient's goal of therapy? Improvement of anxiety   Is this being achieved with current treatment? yes     JEANNETTE-7:  No data recorded     Activities of Daily Living:   Is your overall impression that this patient is benefiting (symptom reduction outweighs side effects) from benzodiazepine therapy? Yes      1. Physical Functioning: Better  2. Family Relationship: Better  3. Social Relationship: Better  4. Mood: Better  5. Sleep Patterns: Better  6. Overall Function: Better        Assessment/Plan   Problem List Items Addressed This Visit       Anxiety    Relevant Medications    ALPRAZolam (Xanax) 0.5 mg tablet     Other Visit Diagnoses       Controlled substance agreement signed    -  Primary    Relevant Medications    azithromycin (Zithromax) 500 mg tablet    Other Relevant Orders    Opiate/Opioid/Benzo Prescription Compliance    OOB Internal Tracking    Primary hypertension        Relevant Medications    amLODIPine-benazepriL (Lotrel) 10-20 mg capsule    Gastroesophageal reflux disease without esophagitis        Relevant Medications    esomeprazole (NexIUM) 20 mg DR capsule    Seasonal allergic rhinitis due to pollen        Relevant Medications    fluticasone (Flonase) 50 mcg/actuation nasal spray          1. Anxiety   - uses benzo prn  - CSA and UDS up dated today    - I have personally reviewed this patient's OARRS report and found it to be appropriate. The report has been uploaded into the medical record. I have considered the risks of abuse, addiction, dependence, and diversion and feel that it is clinically appropriate for this patient to be prescribed this controlled medication.     2. HTN , controlled  - continue norvasc-benazapril 10-20 1 tab po daily       3. GERD    - continue Nexium 20mg po daily     4.  Urinary incontinence, elevated psa   -did see Dr Madrigal   - had cystoscope which was normal   - MRI prostate was negative for malignancy but possible area of malignancy in left posterolateral bladder wall near the left UV junction representing a urothelial neoplasm without underlying muscle invasion   - pt did not follow through with another cystoscope, states that if he has bladder cancer he would not want to do any treatment  [facility]      5. Sinusitis   - sent antibiotic.    Informed pt that I will be leaving  in March 25 and he will need to find a new provider after that, follow up in 3 mo regarding xanax unless establishes with new provider .   Final diagnoses:   [F41.9] Anxiety   [I10] Primary hypertension   [K21.9] Gastroesophageal reflux disease without esophagitis   [J30.1] Seasonal allergic rhinitis due to pollen   [Z79.899] Controlled substance agreement signed

## 2024-11-13 LAB
AMPHETAMINES UR QL SCN: NORMAL
BARBITURATES UR QL SCN: NORMAL
BZE UR QL SCN: NORMAL
CANNABINOIDS UR QL SCN: NORMAL
CREAT UR-MCNC: 185.8 MG/DL (ref 20–370)
PCP UR QL SCN: NORMAL

## 2024-11-14 ENCOUNTER — TELEPHONE (OUTPATIENT)
Dept: GASTROENTEROLOGY | Facility: CLINIC | Age: 84
End: 2024-11-14
Payer: MEDICARE

## 2024-11-14 DIAGNOSIS — Z86.0100 HISTORY OF COLON POLYPS: ICD-10-CM

## 2024-11-14 NOTE — TELEPHONE ENCOUNTER
----- Message from Abiodun Heath sent at 11/11/2024  1:23 PM EST -----  Regarding: RE: colonoscopy  He did have a tubulovillous polyp at his last colonoscopy.  Best practice would be repeating in 3 years.  If he wishes and did not have any screening procedures he does have a perceived risk of colon cancer however he be well in his 90s before would kill him  ----- Message -----  From: Christy Wills MA  Sent: 11/11/2024   9:33 AM EST  To: Abiodun Heath, DO  Subject: colonoscopy                                      Pt is 84 due for repeat colonoscopy 1/25. Does this need completed or has he aged out?

## 2024-11-15 LAB
1OH-MIDAZOLAM UR CFM-MCNC: <25 NG/ML
6MAM UR CFM-MCNC: <25 NG/ML
7AMINOCLONAZEPAM UR CFM-MCNC: <25 NG/ML
A-OH ALPRAZ UR CFM-MCNC: 187 NG/ML
ALPRAZ UR CFM-MCNC: 101 NG/ML
CHLORDIAZEP UR CFM-MCNC: <25 NG/ML
CLONAZEPAM UR CFM-MCNC: <25 NG/ML
CODEINE UR CFM-MCNC: <50 NG/ML
DIAZEPAM UR CFM-MCNC: <25 NG/ML
EDDP UR CFM-MCNC: <25 NG/ML
FENTANYL UR CFM-MCNC: <2.5 NG/ML
HYDROCODONE CTO UR CFM-MCNC: <25 NG/ML
HYDROMORPHONE UR CFM-MCNC: <25 NG/ML
LORAZEPAM UR CFM-MCNC: <25 NG/ML
METHADONE UR CFM-MCNC: <25 NG/ML
MIDAZOLAM UR CFM-MCNC: <25 NG/ML
MORPHINE UR CFM-MCNC: <50 NG/ML
NORDIAZEPAM UR CFM-MCNC: <25 NG/ML
NORFENTANYL UR CFM-MCNC: <2.5 NG/ML
NORHYDROCODONE UR CFM-MCNC: <25 NG/ML
NOROXYCODONE UR CFM-MCNC: <25 NG/ML
NORTRAMADOL UR-MCNC: <50 NG/ML
OXAZEPAM UR CFM-MCNC: <25 NG/ML
OXYCODONE UR CFM-MCNC: <25 NG/ML
OXYMORPHONE UR CFM-MCNC: <25 NG/ML
TEMAZEPAM UR CFM-MCNC: <25 NG/ML
TRAMADOL UR CFM-MCNC: <50 NG/ML
ZOLPIDEM UR CFM-MCNC: <25 NG/ML
ZOLPIDEM UR-MCNC: <25 NG/ML

## 2025-01-11 ENCOUNTER — HOSPITAL ENCOUNTER (EMERGENCY)
Facility: HOSPITAL | Age: 85
Discharge: HOME | End: 2025-01-11
Attending: EMERGENCY MEDICINE
Payer: MEDICARE

## 2025-01-11 VITALS
TEMPERATURE: 97.3 F | WEIGHT: 153 LBS | DIASTOLIC BLOOD PRESSURE: 76 MMHG | SYSTOLIC BLOOD PRESSURE: 130 MMHG | BODY MASS INDEX: 21.34 KG/M2 | HEART RATE: 77 BPM | OXYGEN SATURATION: 98 % | RESPIRATION RATE: 16 BRPM

## 2025-01-11 DIAGNOSIS — R29.898 WEAKNESS OF LEFT LOWER EXTREMITY: Primary | ICD-10-CM

## 2025-01-11 LAB
ANION GAP SERPL CALC-SCNC: 16 MMOL/L (ref 10–20)
BASOPHILS # BLD AUTO: 0.04 X10*3/UL (ref 0–0.1)
BASOPHILS NFR BLD AUTO: 0.6 %
BUN SERPL-MCNC: 13 MG/DL (ref 6–23)
CALCIUM SERPL-MCNC: 8.8 MG/DL (ref 8.6–10.3)
CHLORIDE SERPL-SCNC: 101 MMOL/L (ref 98–107)
CO2 SERPL-SCNC: 23 MMOL/L (ref 21–32)
CREAT SERPL-MCNC: 1.01 MG/DL (ref 0.5–1.3)
EGFRCR SERPLBLD CKD-EPI 2021: 74 ML/MIN/1.73M*2
EOSINOPHIL # BLD AUTO: 0.27 X10*3/UL (ref 0–0.4)
EOSINOPHIL NFR BLD AUTO: 3.8 %
ERYTHROCYTE [DISTWIDTH] IN BLOOD BY AUTOMATED COUNT: 15.9 % (ref 11.5–14.5)
GLUCOSE SERPL-MCNC: 93 MG/DL (ref 74–99)
HCT VFR BLD AUTO: 39.3 % (ref 41–52)
HGB BLD-MCNC: 13.7 G/DL (ref 13.5–17.5)
IMM GRANULOCYTES # BLD AUTO: 0.02 X10*3/UL (ref 0–0.5)
IMM GRANULOCYTES NFR BLD AUTO: 0.3 % (ref 0–0.9)
LYMPHOCYTES # BLD AUTO: 1.91 X10*3/UL (ref 0.8–3)
LYMPHOCYTES NFR BLD AUTO: 26.6 %
MCH RBC QN AUTO: 36 PG (ref 26–34)
MCHC RBC AUTO-ENTMCNC: 34.9 G/DL (ref 32–36)
MCV RBC AUTO: 103 FL (ref 80–100)
MONOCYTES # BLD AUTO: 0.46 X10*3/UL (ref 0.05–0.8)
MONOCYTES NFR BLD AUTO: 6.4 %
NEUTROPHILS # BLD AUTO: 4.49 X10*3/UL (ref 1.6–5.5)
NEUTROPHILS NFR BLD AUTO: 62.3 %
NRBC BLD-RTO: 0 /100 WBCS (ref 0–0)
PLATELET # BLD AUTO: 209 X10*3/UL (ref 150–450)
POTASSIUM SERPL-SCNC: 3.3 MMOL/L (ref 3.5–5.3)
RBC # BLD AUTO: 3.81 X10*6/UL (ref 4.5–5.9)
SODIUM SERPL-SCNC: 137 MMOL/L (ref 136–145)
WBC # BLD AUTO: 7.2 X10*3/UL (ref 4.4–11.3)

## 2025-01-11 PROCEDURE — 85025 COMPLETE CBC W/AUTO DIFF WBC: CPT | Performed by: EMERGENCY MEDICINE

## 2025-01-11 PROCEDURE — 99283 EMERGENCY DEPT VISIT LOW MDM: CPT | Performed by: EMERGENCY MEDICINE

## 2025-01-11 PROCEDURE — 80048 BASIC METABOLIC PNL TOTAL CA: CPT | Performed by: EMERGENCY MEDICINE

## 2025-01-11 PROCEDURE — 36415 COLL VENOUS BLD VENIPUNCTURE: CPT | Performed by: EMERGENCY MEDICINE

## 2025-01-11 ASSESSMENT — PAIN SCALES - GENERAL
PAINLEVEL_OUTOF10: 0 - NO PAIN
PAINLEVEL_OUTOF10: 0 - NO PAIN

## 2025-01-11 ASSESSMENT — PAIN - FUNCTIONAL ASSESSMENT
PAIN_FUNCTIONAL_ASSESSMENT: 0-10
PAIN_FUNCTIONAL_ASSESSMENT: 0-10

## 2025-01-11 ASSESSMENT — COLUMBIA-SUICIDE SEVERITY RATING SCALE - C-SSRS
6. HAVE YOU EVER DONE ANYTHING, STARTED TO DO ANYTHING, OR PREPARED TO DO ANYTHING TO END YOUR LIFE?: NO
1. IN THE PAST MONTH, HAVE YOU WISHED YOU WERE DEAD OR WISHED YOU COULD GO TO SLEEP AND NOT WAKE UP?: NO
2. HAVE YOU ACTUALLY HAD ANY THOUGHTS OF KILLING YOURSELF?: NO

## 2025-01-11 NOTE — ED PROVIDER NOTES
84-year-old male presents for chief complaint of left lower extremity weakness.  He ambulated from the living room to the bedroom without any difficulty.  States he got down on the floor for some reason he is not exactly sure why but then was unable to get back up off the floor.  States he just had no strength in his left lower extremity.  The patient's girlfriend contacted EMS.  Patient currently has no complaints.  He feels his left lower extremity is back to normal strength.         Review of Systems     Physical Exam  Vitals and nursing note reviewed.   Constitutional:       General: He is not in acute distress.     Appearance: He is well-developed.   HENT:      Head: Normocephalic and atraumatic.   Eyes:      Conjunctiva/sclera: Conjunctivae normal.   Cardiovascular:      Rate and Rhythm: Normal rate and regular rhythm.      Heart sounds: No murmur heard.  Pulmonary:      Effort: Pulmonary effort is normal. No respiratory distress.      Breath sounds: Normal breath sounds.   Abdominal:      Palpations: Abdomen is soft.      Tenderness: There is no abdominal tenderness.   Musculoskeletal:         General: No swelling.      Cervical back: Neck supple.   Skin:     General: Skin is warm and dry.      Capillary Refill: Capillary refill takes less than 2 seconds.   Neurological:      Mental Status: He is alert.   Psychiatric:         Mood and Affect: Mood normal.          Labs Reviewed   CBC WITH AUTO DIFFERENTIAL - Abnormal       Result Value    WBC 7.2      nRBC 0.0      RBC 3.81 (*)     Hemoglobin 13.7      Hematocrit 39.3 (*)      (*)     MCH 36.0 (*)     MCHC 34.9      RDW 15.9 (*)     Platelets 209      Neutrophils % 62.3      Immature Granulocytes %, Automated 0.3      Lymphocytes % 26.6      Monocytes % 6.4      Eosinophils % 3.8      Basophils % 0.6      Neutrophils Absolute 4.49      Immature Granulocytes Absolute, Automated 0.02      Lymphocytes Absolute 1.91      Monocytes Absolute 0.46       Eosinophils Absolute 0.27      Basophils Absolute 0.04     BASIC METABOLIC PANEL - Abnormal    Glucose 93      Sodium 137      Potassium 3.3 (*)     Chloride 101      Bicarbonate 23      Anion Gap 16      Urea Nitrogen 13      Creatinine 1.01      eGFR 74      Calcium 8.8          No orders to display        Procedures     Medical Decision Making  84-year-old male presents for chief complaint of left lower extremity weakness.  He ambulated from the living room to the bedroom without any difficulty.  States he got down on the floor for some reason he is not exactly sure why but then was unable to get back up off the floor.  States he just had no strength in his left lower extremity.  The patient's girlfriend contacted EMS.  Patient currently has no complaints.  He feels his left lower extremity is back to normal strength.  Patient's laboratory studies unremarkable for acute findings.  NIH scale is 0.  He ambulates without gait deviation.  Patient can be discharged home.    DDx: CVA, TIA, transient weakness, slight abnormality.         Diagnoses as of 01/11/25 0533   Weakness of left lower extremity                    Irvin Sofia MD  01/11/25 0533

## 2025-01-28 ENCOUNTER — APPOINTMENT (OUTPATIENT)
Dept: GASTROENTEROLOGY | Facility: CLINIC | Age: 85
End: 2025-01-28
Payer: MEDICARE

## 2025-02-12 ENCOUNTER — APPOINTMENT (OUTPATIENT)
Dept: PRIMARY CARE | Facility: CLINIC | Age: 85
End: 2025-02-12
Payer: MEDICARE

## 2025-02-12 DIAGNOSIS — I73.9 PERIPHERAL VASCULAR DISEASE, UNSPECIFIED (CMS-HCC): ICD-10-CM

## 2025-02-12 DIAGNOSIS — R35.0 BENIGN PROSTATIC HYPERPLASIA WITH URINARY FREQUENCY: Primary | ICD-10-CM

## 2025-02-12 DIAGNOSIS — F41.9 ANXIETY: ICD-10-CM

## 2025-02-12 DIAGNOSIS — I10 HYPERTENSION, BENIGN: ICD-10-CM

## 2025-02-12 DIAGNOSIS — N40.1 BENIGN PROSTATIC HYPERPLASIA WITH URINARY FREQUENCY: Primary | ICD-10-CM

## 2025-02-12 PROCEDURE — 99213 OFFICE O/P EST LOW 20 MIN: CPT | Performed by: INTERNAL MEDICINE

## 2025-02-12 PROCEDURE — 1160F RVW MEDS BY RX/DR IN RCRD: CPT | Performed by: INTERNAL MEDICINE

## 2025-02-12 PROCEDURE — 1159F MED LIST DOCD IN RCRD: CPT | Performed by: INTERNAL MEDICINE

## 2025-02-12 RX ORDER — ALPRAZOLAM 0.5 MG/1
0.5 TABLET ORAL DAILY PRN
Qty: 30 TABLET | Refills: 2 | Status: SHIPPED | OUTPATIENT
Start: 2025-02-12

## 2025-02-12 ASSESSMENT — ENCOUNTER SYMPTOMS
RHINORRHEA: 0
SHORTNESS OF BREATH: 0
COUGH: 0

## 2025-02-12 NOTE — PROGRESS NOTES
Subjective   Patient ID: Kashif Capps is a 84 y.o. male who presents for No chief complaint on file..  HPI  Patient is here today for 3 mo follow up via telephone.    Pt and physician are at two separate locations.   Pt was verbally consented for the encounter.     Pt reports that he had a pretty serious fall, tripped over the hose of his shop vac.   Patient did go the hospital, he did not have to have stiches or anything.     Feels like his memory is worsening, discussed ordering bloodwork as it has been a while since he had regular bloodwork, declines for now.     Review of Systems   HENT:  Negative for congestion, nosebleeds, postnasal drip and rhinorrhea.    Respiratory:  Negative for cough and shortness of breath.    Cardiovascular:  Negative for chest pain and leg swelling.   Neurological:         +worsening memory        Objective   There were no vitals taken for this visit.    Physical Exam  NO physical exam was completed due to telephone visit.     ARRS:  No data recorded  I have personally reviewed the OARRS report for Kashif Capps. I have considered the risks of abuse, dependence, addiction and diversion and I believe that it is clinically appropriate for Kashif Capps to be prescribed this medication     Is the patient prescribed a combination of a benzodiazepine and opioid?  No     Last Urine Drug Screen / ordered today: Yes  Recent Results         Component  Ref Range & Units 3 mo ago   Clonazepam  <25 ng/mL <25   7-Aminoclonazepam  <25 ng/mL <25   Alprazolam  <25 ng/mL 44 High    Comment: Consistent with use of a drug containing alprazolam, such as Xanax.   Alpha-Hydroxyalprazolam  <25 ng/mL 79 High    Comment: Alprazolam metabolite; consistent with use of a drug containing alprazolam, such as Xanax.   Midazolam  <25 ng/mL <25   Alpha-Hydroxymidazolam  <25 ng/mL <25   Chlordiazepoxide  <25 ng/mL <25   Diazepam  <25 ng/mL <25   Nordiazepam  <25 ng/mL <25   Temazepam  <25 ng/mL <25   Oxazepam  <25  ng/mL <25   Lorazepam  <25 ng/mL <25   Methadone  <25 ng/mL <25   EDDP  <25 ng/mL <25   6-Acetylmorphine  <25 ng/mL <25   Codeine  <50 ng/mL <50   Hydrocodone  <25 ng/mL <25   Hydromorphone  <25 ng/mL <25   Morphine  <50 ng/mL <50   Norhydrocodone  <25 ng/mL <25   Noroxycodone  <25 ng/mL <25   Oxycodone  <25 ng/mL <25   Oxymorphone  <25 ng/mL <25   Fentanyl  <2.5 ng/mL <2.5   Norfentanyl  <2.5 ng/mL <2.5   Tramadol  <50 ng/mL <50   O-Desmethyltramadol  <50 ng/mL <50   Zolpidem  <25 ng/mL <25   Zolpidem Metabolite (ZCA)  <25 ng/mL <25   Resulting Agency [facility]                Narrative  Performed by: [facility]  The performance characteristics of this test has  been validated by the individual  laboratory site where  testing is performed. It has not been cleared or approved  by the FDA. However the FDA has determined that such clearance  or approval is not necessary. Our Laboratory is certified under  the Clinical Laboratory Improvement Amendments of 1988 (CLIA)  as qualified to perform high complexity clinical laboratory testing.                  Controlled Substance Agreement:  Date of the Last Agreement: 11/08/2023  Reviewed Controlled Substance Agreement including but not limited to the benefits, risks, and alternatives to treatment with a Controlled Substance medication(s).     Benzodiazepines:  What is the patient's goal of therapy? Improvement of anxiety   Is this being achieved with current treatment? yes     JEANNETTE-7:  No data recorded     Activities of Daily Living:   Is your overall impression that this patient is benefiting (symptom reduction outweighs side effects) from benzodiazepine therapy? Yes      1. Physical Functioning: Better  2. Family Relationship: Better  3. Social Relationship: Better  4. Mood: Better  5. Sleep Patterns: Better  6. Overall Function: Better           Assessment/Plan  Assessment/Plan   Problem List Items Addressed This Visit       Anxiety    Relevant Medications    ALPRAZolam (Xanax)  0.5 mg tablet    Hypertension, benign    Peripheral vascular disease, unspecified (CMS-HCC)    BPH (benign prostatic hyperplasia) - Primary     1. Anxiety   - uses benzo prn  - CSA and UDS up to date and as expected     - I have personally reviewed this patient's OARRS report and found it to be appropriate. The report has been uploaded into the medical record. I have considered the risks of abuse, addiction, dependence, and diversion and feel that it is clinically appropriate for this patient to be prescribed this controlled medication.     2. HTN , controlled  - continue norvasc-benazapril 10-20 1 tab po daily       3. GERD   - continue Nexium 20mg po daily     4.  Urinary incontinence, elevated psa   -did see Dr Madrigal   - had cystoscope which was normal   - MRI prostate was negative for malignancy but possible area of malignancy in left posterolateral bladder wall near the left UV junction representing a urothelial neoplasm without underlying muscle invasion   - pt did not follow through with another cystoscope, states that if he has bladder cancer he would not want to do any treatment anyway's     5. Worsening memory   - recommend bloodwork including cbc, cmp, lipid, tsh, b12, and consider brain mri, declines for now      Informed pt that I will be leaving  in March 25 and he will need to find a new provider   Final diagnoses:   [F41.9] Anxiety   [N40.1, R35.0] Benign prostatic hyperplasia with urinary frequency   [I73.9] Peripheral vascular disease, unspecified (CMS-HCC)   [I10] Hypertension, benign

## 2025-04-15 DIAGNOSIS — Z86.0100 HISTORY OF COLON POLYPS: ICD-10-CM

## 2025-04-15 DIAGNOSIS — Z12.11 COLON CANCER SCREENING: ICD-10-CM

## 2025-04-29 ENCOUNTER — HOSPITAL ENCOUNTER (OUTPATIENT)
Dept: OPERATING ROOM | Facility: HOSPITAL | Age: 85
Setting detail: OUTPATIENT SURGERY
Discharge: HOME | End: 2025-04-29
Payer: MEDICARE

## 2025-04-29 VITALS
BODY MASS INDEX: 21.14 KG/M2 | HEIGHT: 72 IN | SYSTOLIC BLOOD PRESSURE: 149 MMHG | WEIGHT: 156.09 LBS | RESPIRATION RATE: 16 BRPM | TEMPERATURE: 97.5 F | DIASTOLIC BLOOD PRESSURE: 99 MMHG | OXYGEN SATURATION: 98 % | HEART RATE: 99 BPM

## 2025-04-29 DIAGNOSIS — Z12.11 COLON CANCER SCREENING: ICD-10-CM

## 2025-04-29 DIAGNOSIS — Z86.0100 HISTORY OF COLON POLYPS: ICD-10-CM

## 2025-04-29 PROCEDURE — G0105 COLORECTAL SCRN; HI RISK IND: HCPCS | Performed by: INTERNAL MEDICINE

## 2025-04-29 PROCEDURE — 3700000012 HC SEDATION LEVEL 5+ TIME - INITIAL 15 MINUTES 5/> YEARS: Performed by: INTERNAL MEDICINE

## 2025-04-29 PROCEDURE — 3600000002 HC OR TIME - INITIAL BASE CHARGE - PROCEDURE LEVEL TWO: Performed by: INTERNAL MEDICINE

## 2025-04-29 PROCEDURE — 2500000004 HC RX 250 GENERAL PHARMACY W/ HCPCS (ALT 636 FOR OP/ED): Performed by: INTERNAL MEDICINE

## 2025-04-29 PROCEDURE — 7100000010 HC PHASE TWO TIME - EACH INCREMENTAL 1 MINUTE: Performed by: INTERNAL MEDICINE

## 2025-04-29 PROCEDURE — G0500 MOD SEDAT ENDO SERVICE >5YRS: HCPCS | Performed by: INTERNAL MEDICINE

## 2025-04-29 PROCEDURE — 7100000009 HC PHASE TWO TIME - INITIAL BASE CHARGE: Performed by: INTERNAL MEDICINE

## 2025-04-29 PROCEDURE — 3600000007 HC OR TIME - EACH INCREMENTAL 1 MINUTE - PROCEDURE LEVEL TWO: Performed by: INTERNAL MEDICINE

## 2025-04-29 RX ORDER — FENTANYL CITRATE 50 UG/ML
INJECTION, SOLUTION INTRAMUSCULAR; INTRAVENOUS AS NEEDED
Status: COMPLETED | OUTPATIENT
Start: 2025-04-29 | End: 2025-04-29

## 2025-04-29 RX ORDER — SODIUM CHLORIDE, SODIUM LACTATE, POTASSIUM CHLORIDE, CALCIUM CHLORIDE 600; 310; 30; 20 MG/100ML; MG/100ML; MG/100ML; MG/100ML
50 INJECTION, SOLUTION INTRAVENOUS CONTINUOUS
Status: DISCONTINUED | OUTPATIENT
Start: 2025-04-29 | End: 2025-04-30 | Stop reason: HOSPADM

## 2025-04-29 RX ORDER — MIDAZOLAM HYDROCHLORIDE 5 MG/ML
INJECTION, SOLUTION INTRAMUSCULAR; INTRAVENOUS AS NEEDED
Status: COMPLETED | OUTPATIENT
Start: 2025-04-29 | End: 2025-04-29

## 2025-04-29 RX ADMIN — FENTANYL CITRATE 50 MCG: 50 INJECTION, SOLUTION INTRAMUSCULAR; INTRAVENOUS at 11:59

## 2025-04-29 RX ADMIN — MIDAZOLAM HYDROCHLORIDE 2.5 MG: 5 INJECTION, SOLUTION INTRAMUSCULAR; INTRAVENOUS at 11:59

## 2025-04-29 ASSESSMENT — COLUMBIA-SUICIDE SEVERITY RATING SCALE - C-SSRS
1. IN THE PAST MONTH, HAVE YOU WISHED YOU WERE DEAD OR WISHED YOU COULD GO TO SLEEP AND NOT WAKE UP?: NO
2. HAVE YOU ACTUALLY HAD ANY THOUGHTS OF KILLING YOURSELF?: NO
6. HAVE YOU EVER DONE ANYTHING, STARTED TO DO ANYTHING, OR PREPARED TO DO ANYTHING TO END YOUR LIFE?: NO

## 2025-04-29 ASSESSMENT — PAIN SCALES - GENERAL
PAINLEVEL_OUTOF10: 0 - NO PAIN
PAINLEVEL_OUTOF10: 0 - NO PAIN
PAINLEVEL_OUTOF10: 1
PAINLEVEL_OUTOF10: 0 - NO PAIN
PAINLEVEL_OUTOF10: 0 - NO PAIN
PAINLEVEL_OUTOF10: 4
PAINLEVEL_OUTOF10: 0 - NO PAIN

## 2025-04-29 ASSESSMENT — PAIN DESCRIPTION - DESCRIPTORS: DESCRIPTORS: ACHING

## 2025-04-29 ASSESSMENT — PAIN - FUNCTIONAL ASSESSMENT
PAIN_FUNCTIONAL_ASSESSMENT: 0-10

## 2025-04-29 NOTE — H&P
History Of Present Illness  Kashif Capps is a 84 y.o. male presenting with history of colon polyps presents for surveillance colonoscopy.     Past Medical History  Medical History[1]  Surgical History  Surgical History[2]  Social History  He reports that he has been smoking cigarettes. He started smoking about 69 years ago. He has a 104 pack-year smoking history. He has never used smokeless tobacco. He reports current alcohol use of about 24.0 standard drinks of alcohol per week. He reports that he does not use drugs.    Family History  Family History[3]     Allergies  Allergies[4]  Review of Systems  Pre-sedation Evaluation:  ASA Classification - ASA 2 - Patient with mild systemic disease with no functional limitations  Mallampati Score - II (hard and soft palate, upper portion of tonsils and uvula visible)    Physical Exam     Last Recorded Vitals  There were no vitals taken for this visit.     Assessment/Plan   Problem List Items Addressed This Visit    None  Visit Diagnoses         History of colon polyps        Relevant Orders    Colonoscopy Screening; High Risk Patient      Colon cancer screening        Relevant Orders    Colonoscopy Screening; High Risk Patient               PTA/Current Medications:  Prescriptions Prior to Admission[5]  Current Medications[6]  Abiodun Heath DO       [1]   Past Medical History:  Diagnosis Date    Anxiety disorder, unspecified 12/06/2022    Anxiety    Essential (primary) hypertension 12/06/2022    HTN (hypertension)    GERD (gastroesophageal reflux disease)     Personal history of other diseases of the digestive system 06/08/2020    History of right inguinal hernia    Personal history of other specified conditions     History of dyspnea    Personal history of other specified conditions     History of dizziness    Personal history of pneumonia (recurrent)     History of pneumonia    Unilateral primary osteoarthritis, right hip 01/15/2020    Osteoarthritis of right hip   [2]    Past Surgical History:  Procedure Laterality Date    OTHER SURGICAL HISTORY  01/09/2020    Colonoscopy    OTHER SURGICAL HISTORY  01/09/2020    Vasectomy    OTHER SURGICAL HISTORY  02/17/2022    Inguinal hernia repair laparoscopic   [3] No family history on file.  [4]   Allergies  Allergen Reactions    Levofloxacin Unknown   [5] (Not in a hospital admission)  [6]   Current Outpatient Medications   Medication Sig Dispense Refill    ALPRAZolam (Xanax) 0.5 mg tablet Take 1 tablet (0.5 mg) by mouth once daily as needed for anxiety. 30 tablet 2    amLODIPine-benazepriL (Lotrel) 10-20 mg capsule Take 1 capsule by mouth once daily. 90 capsule 3    esomeprazole (NexIUM) 20 mg DR capsule Take 1 capsule (20 mg) by mouth once daily. 90 capsule 3    fluticasone (Flonase) 50 mcg/actuation nasal spray Administer 1 spray into each nostril once daily. Shake gently. Before first use, prime pump. After use, clean tip and replace cap. 48 g 3    ketorolac (Acular) 0.5 % ophthalmic solution Administer 1 drop into affected eye(s) 4 times a day.      vit C/E/Zn/coppr/lutein/zeaxan (PRESERVISION AREDS-2 ORAL) Take 1 tablet by mouth 2 times a day.       Current Facility-Administered Medications   Medication Dose Route Frequency Provider Last Rate Last Admin    lactated Ringer's infusion  50 mL/hr intravenous Continuous Abiodun Heath DO

## 2025-04-29 NOTE — DISCHARGE INSTRUCTIONS
Patient Instructions after a Colonoscopy      The anesthetics, sedatives or narcotics which were given to you today will be acting in your body for the next 24 hours, so you might feel a little sleepy or groggy.  This feeling should slowly wear off. Carefully read and follow the instructions.     You received sedation today:  - Do not drive or operate any machinery or power tools of any kind.   - No alcoholic beverages today, not even beer or wine.  - Do not make any important decisions or sign any legal documents.  - No over the counter medications that contain alcohol or that may cause drowsiness.  - Do not make any important decisions or sign any legal documents.  - Make sure you have someone with you for first 24 hours.    While it is common to experience mild to moderate abdominal distention, gas, or belching after your procedure, if any of these symptoms occur following discharge from the GI Lab or within one week of having your procedure, call the [facility] to be advised whether a visit to your nearest Urgent Care or Emergency Department is indicated.  Take this paper with you if you go.     - If you develop an allergic reaction to the medications that were given during your procedure such as difficulty breathing, rash, hives, severe nausea, vomiting or lightheadedness.  - If you experience chest pain, shortness of breath, severe abdominal pain, fevers and chills.  -If you develop signs and symptoms of bleeding such as blood in your spit, if your stools turn black, tarry, or bloody  - If you have not urinated within 8 hours following your procedure.  - If your IV site becomes painful, red, inflamed, or looks infected.    If you received a biopsy/polypectomy/sphincterotomy the following instructions apply below:    __ Do not use Aspirin containing products, non-steroidal medications or anti-coagulants for one week following your procedure. (Examples of these types of medications are: Advil,  Arthrotec, Aleve, Coumadin, Ecotrin, Heparin, Ibuprofen, Indocin, Motrin, Naprosyn, Nuprin, Plavix, Vioxx, and Voltarin, or their generic forms.  This list is not all-inclusive.  Check with your physician or pharmacist before resuming medications.)   __ Eat a soft diet today.  Avoid foods that are poorly digested for the next 24 hours.  These foods would include: nuts, beans, lettuce, red meats, and fried foods. Start with liquids and advance your diet as tolerated, gradually work up to eating solids.   __ Do not have a Barium Study or Enema for one week.    Your physician recommends the additional following instructions:    -You have a contact number available for emergencies. The signs and symptoms of potential delayed complications were discussed with you. You may return to normal activities tomorrow.  -Resume your previous diet.  -Continue your present medications.   -We are waiting for your pathology results.  -Your physician has recommended a repeat colonoscopy (date to be determined after pending pathology results are reviewed) for surveillance based on pathology results.  -The findings and recommendations have been discussed with you.  -The findings and recommendations were discussed with your family.  - Please see Medication Reconciliation Form for new medication/medications prescribed.       If you experience any problems or have any questions following discharge from the GI Lab, please call:        Nurse Signature                                                                        Date___________________                                                                            Patient/Responsible Party Signature                                        Date___________________

## 2025-07-15 ENCOUNTER — HOSPITAL ENCOUNTER (OUTPATIENT)
Dept: RADIOLOGY | Facility: HOSPITAL | Age: 85
Discharge: HOME | End: 2025-07-15
Payer: MEDICARE

## 2025-07-15 DIAGNOSIS — M54.41 LUMBAGO WITH SCIATICA, RIGHT SIDE: ICD-10-CM

## 2025-07-15 DIAGNOSIS — M54.42 LUMBAGO WITH SCIATICA, LEFT SIDE: ICD-10-CM

## 2025-07-15 DIAGNOSIS — G89.29 OTHER CHRONIC PAIN: ICD-10-CM

## 2025-07-15 PROCEDURE — 72148 MRI LUMBAR SPINE W/O DYE: CPT

## 2025-07-15 PROCEDURE — 72148 MRI LUMBAR SPINE W/O DYE: CPT | Performed by: RADIOLOGY

## 2025-08-18 ENCOUNTER — OFFICE VISIT (OUTPATIENT)
Dept: PAIN MEDICINE | Facility: CLINIC | Age: 85
End: 2025-08-18
Payer: MEDICARE

## 2025-08-18 VITALS
SYSTOLIC BLOOD PRESSURE: 137 MMHG | DIASTOLIC BLOOD PRESSURE: 85 MMHG | BODY MASS INDEX: 21.23 KG/M2 | WEIGHT: 155 LBS | HEART RATE: 97 BPM | RESPIRATION RATE: 16 BRPM

## 2025-08-18 DIAGNOSIS — M47.816 LUMBAR FACET ARTHROPATHY: ICD-10-CM

## 2025-08-18 DIAGNOSIS — M48.062 LUMBAR STENOSIS WITH NEUROGENIC CLAUDICATION: Primary | ICD-10-CM

## 2025-08-18 PROCEDURE — 99214 OFFICE O/P EST MOD 30 MIN: CPT

## 2025-08-18 RX ORDER — SODIUM CHLORIDE 9 MG/ML
4 INJECTION, SOLUTION INTRAMUSCULAR; INTRAVENOUS; SUBCUTANEOUS ONCE
OUTPATIENT
Start: 2025-08-18 | End: 2025-08-18

## 2025-08-18 RX ORDER — LIDOCAINE HYDROCHLORIDE 20 MG/ML
6 INJECTION, SOLUTION EPIDURAL; INFILTRATION; INTRACAUDAL; PERINEURAL ONCE
OUTPATIENT
Start: 2025-08-18 | End: 2025-08-18

## 2025-08-18 RX ORDER — METHYLPREDNISOLONE ACETATE 40 MG/ML
40 INJECTION, SUSPENSION INTRA-ARTICULAR; INTRALESIONAL; INTRAMUSCULAR; SOFT TISSUE ONCE
OUTPATIENT
Start: 2025-08-18 | End: 2025-08-18

## 2025-08-18 ASSESSMENT — ENCOUNTER SYMPTOMS
EYES NEGATIVE: 1
FACIAL ASYMMETRY: 0
ENDOCRINE NEGATIVE: 1
ARTHRALGIAS: 0
BRUISES/BLEEDS EASILY: 0
LIGHT-HEADEDNESS: 0
PALPITATIONS: 0
ALLERGIC/IMMUNOLOGIC NEGATIVE: 1
ADENOPATHY: 0
COUGH: 0
SHORTNESS OF BREATH: 0
DYSPHORIC MOOD: 0
WHEEZING: 0
BACK PAIN: 1
MYALGIAS: 1
CONSTITUTIONAL NEGATIVE: 1
WEAKNESS: 0

## 2025-08-18 ASSESSMENT — PATIENT HEALTH QUESTIONNAIRE - PHQ9
2. FEELING DOWN, DEPRESSED OR HOPELESS: NOT AT ALL
1. LITTLE INTEREST OR PLEASURE IN DOING THINGS: NOT AT ALL
SUM OF ALL RESPONSES TO PHQ9 QUESTIONS 1 AND 2: 0

## 2025-08-18 ASSESSMENT — COLUMBIA-SUICIDE SEVERITY RATING SCALE - C-SSRS
6. HAVE YOU EVER DONE ANYTHING, STARTED TO DO ANYTHING, OR PREPARED TO DO ANYTHING TO END YOUR LIFE?: NO
2. HAVE YOU ACTUALLY HAD ANY THOUGHTS OF KILLING YOURSELF?: NO
1. IN THE PAST MONTH, HAVE YOU WISHED YOU WERE DEAD OR WISHED YOU COULD GO TO SLEEP AND NOT WAKE UP?: NO

## 2025-08-19 ENCOUNTER — TELEPHONE (OUTPATIENT)
Dept: PAIN MEDICINE | Facility: CLINIC | Age: 85
End: 2025-08-19
Payer: MEDICARE

## 2025-09-02 ENCOUNTER — HOSPITAL ENCOUNTER (OUTPATIENT)
Dept: OPERATING ROOM | Facility: HOSPITAL | Age: 85
Setting detail: OUTPATIENT SURGERY
Discharge: HOME | End: 2025-09-02
Payer: MEDICARE

## 2025-09-02 VITALS
SYSTOLIC BLOOD PRESSURE: 127 MMHG | DIASTOLIC BLOOD PRESSURE: 96 MMHG | RESPIRATION RATE: 16 BRPM | HEIGHT: 72 IN | TEMPERATURE: 98.9 F | OXYGEN SATURATION: 98 % | WEIGHT: 154 LBS | HEART RATE: 100 BPM | BODY MASS INDEX: 20.86 KG/M2

## 2025-09-02 DIAGNOSIS — M48.062 LUMBAR STENOSIS WITH NEUROGENIC CLAUDICATION: ICD-10-CM

## 2025-09-02 PROCEDURE — 2550000001 HC RX 255 CONTRASTS: Mod: JW

## 2025-09-02 PROCEDURE — 62323 NJX INTERLAMINAR LMBR/SAC: CPT | Performed by: STUDENT IN AN ORGANIZED HEALTH CARE EDUCATION/TRAINING PROGRAM

## 2025-09-02 PROCEDURE — 7100000010 HC PHASE TWO TIME - EACH INCREMENTAL 1 MINUTE

## 2025-09-02 PROCEDURE — 2500000004 HC RX 250 GENERAL PHARMACY W/ HCPCS (ALT 636 FOR OP/ED)

## 2025-09-02 PROCEDURE — 2500000004 HC RX 250 GENERAL PHARMACY W/ HCPCS (ALT 636 FOR OP/ED): Mod: JZ | Performed by: STUDENT IN AN ORGANIZED HEALTH CARE EDUCATION/TRAINING PROGRAM

## 2025-09-02 PROCEDURE — 7100000009 HC PHASE TWO TIME - INITIAL BASE CHARGE

## 2025-09-02 RX ORDER — SODIUM CHLORIDE 9 MG/ML
4 INJECTION, SOLUTION INTRAMUSCULAR; INTRAVENOUS; SUBCUTANEOUS ONCE
Status: DISCONTINUED | OUTPATIENT
Start: 2025-09-02 | End: 2025-09-03 | Stop reason: HOSPADM

## 2025-09-02 RX ORDER — METHYLPREDNISOLONE ACETATE 40 MG/ML
INJECTION, SUSPENSION INTRA-ARTICULAR; INTRALESIONAL; INTRAMUSCULAR; SOFT TISSUE AS NEEDED
Status: COMPLETED | OUTPATIENT
Start: 2025-09-02 | End: 2025-09-02

## 2025-09-02 RX ORDER — LIDOCAINE HYDROCHLORIDE 20 MG/ML
6 INJECTION, SOLUTION EPIDURAL; INFILTRATION; INTRACAUDAL; PERINEURAL ONCE
Status: COMPLETED | OUTPATIENT
Start: 2025-09-02 | End: 2025-09-02

## 2025-09-02 RX ORDER — METHYLPREDNISOLONE ACETATE 40 MG/ML
40 INJECTION, SUSPENSION INTRA-ARTICULAR; INTRALESIONAL; INTRAMUSCULAR; SOFT TISSUE ONCE
Status: DISCONTINUED | OUTPATIENT
Start: 2025-09-02 | End: 2025-09-03 | Stop reason: HOSPADM

## 2025-09-02 RX ADMIN — METHYLPREDNISOLONE ACETATE 40 MG: 40 INJECTION, SUSPENSION INTRA-ARTICULAR; INTRALESIONAL; INTRAMUSCULAR; SOFT TISSUE at 11:20

## 2025-09-02 RX ADMIN — IOHEXOL 1 ML: 300 INJECTION, SOLUTION INTRAVENOUS at 11:19

## 2025-09-02 RX ADMIN — LIDOCAINE HYDROCHLORIDE 5 ML: 20 INJECTION, SOLUTION EPIDURAL; INFILTRATION; INTRACAUDAL; PERINEURAL at 11:19

## 2025-09-02 ASSESSMENT — PAIN - FUNCTIONAL ASSESSMENT
PAIN_FUNCTIONAL_ASSESSMENT: 0-10
PAIN_FUNCTIONAL_ASSESSMENT: 0-10

## 2025-09-02 ASSESSMENT — PAIN DESCRIPTION - DESCRIPTORS: DESCRIPTORS: ACHING

## 2025-09-02 ASSESSMENT — PAIN SCALES - GENERAL
PAINLEVEL_OUTOF10: 8
PAINLEVEL_OUTOF10: 4

## (undated) DEVICE — Device